# Patient Record
Sex: MALE | Race: BLACK OR AFRICAN AMERICAN | NOT HISPANIC OR LATINO | URBAN - METROPOLITAN AREA
[De-identification: names, ages, dates, MRNs, and addresses within clinical notes are randomized per-mention and may not be internally consistent; named-entity substitution may affect disease eponyms.]

---

## 2017-01-03 ENCOUNTER — AMBULATORY - HEALTHEAST (OUTPATIENT)
Dept: CARDIOLOGY | Facility: CLINIC | Age: 77
End: 2017-01-03

## 2017-01-03 ENCOUNTER — OFFICE VISIT - HEALTHEAST (OUTPATIENT)
Dept: INTERNAL MEDICINE | Facility: CLINIC | Age: 77
End: 2017-01-03

## 2017-01-03 ENCOUNTER — COMMUNICATION - HEALTHEAST (OUTPATIENT)
Dept: CARDIOLOGY | Facility: CLINIC | Age: 77
End: 2017-01-03

## 2017-01-03 ENCOUNTER — RECORDS - HEALTHEAST (OUTPATIENT)
Dept: GENERAL RADIOLOGY | Facility: CLINIC | Age: 77
End: 2017-01-03

## 2017-01-03 DIAGNOSIS — I48.19 PERSISTENT ATRIAL FIBRILLATION (H): ICD-10-CM

## 2017-01-03 DIAGNOSIS — R07.9 CHEST PAIN: ICD-10-CM

## 2017-01-03 DIAGNOSIS — R07.9 CHEST PAIN, UNSPECIFIED: ICD-10-CM

## 2017-01-03 ASSESSMENT — MIFFLIN-ST. JEOR: SCORE: 1416.14

## 2017-01-10 ENCOUNTER — AMBULATORY - HEALTHEAST (OUTPATIENT)
Dept: CARDIOLOGY | Facility: CLINIC | Age: 77
End: 2017-01-10

## 2017-01-10 DIAGNOSIS — I48.91 ATRIAL FIBRILLATION (H): ICD-10-CM

## 2017-01-10 ASSESSMENT — MIFFLIN-ST. JEOR
SCORE: 1431.25
SCORE: 1432.01

## 2017-01-11 ENCOUNTER — AMBULATORY - HEALTHEAST (OUTPATIENT)
Dept: CARDIOLOGY | Facility: CLINIC | Age: 77
End: 2017-01-11

## 2017-01-11 ASSESSMENT — MIFFLIN-ST. JEOR: SCORE: 1427.92

## 2017-01-12 ENCOUNTER — SURGERY - HEALTHEAST (OUTPATIENT)
Dept: CARDIOLOGY | Facility: CLINIC | Age: 77
End: 2017-01-12

## 2017-01-12 ASSESSMENT — MIFFLIN-ST. JEOR
SCORE: 1412.5
SCORE: 1368.51

## 2017-01-13 ASSESSMENT — MIFFLIN-ST. JEOR
SCORE: 1338.75
SCORE: 1368.75

## 2017-01-14 ENCOUNTER — COMMUNICATION - HEALTHEAST (OUTPATIENT)
Dept: INTERNAL MEDICINE | Facility: CLINIC | Age: 77
End: 2017-01-14

## 2017-01-14 ASSESSMENT — MIFFLIN-ST. JEOR: SCORE: 1342.2

## 2017-01-16 ENCOUNTER — OFFICE VISIT - HEALTHEAST (OUTPATIENT)
Dept: INTERNAL MEDICINE | Facility: CLINIC | Age: 77
End: 2017-01-16

## 2017-01-16 ENCOUNTER — AMBULATORY - HEALTHEAST (OUTPATIENT)
Dept: CARDIOLOGY | Facility: CLINIC | Age: 77
End: 2017-01-16

## 2017-01-16 ENCOUNTER — AMBULATORY - HEALTHEAST (OUTPATIENT)
Dept: INTERNAL MEDICINE | Facility: CLINIC | Age: 77
End: 2017-01-16

## 2017-01-16 DIAGNOSIS — I25.10 CAD (CORONARY ARTERY DISEASE): ICD-10-CM

## 2017-01-16 DIAGNOSIS — I48.91 ATRIAL FIBRILLATION (H): ICD-10-CM

## 2017-01-16 ASSESSMENT — MIFFLIN-ST. JEOR: SCORE: 1354.9

## 2017-01-19 ENCOUNTER — OFFICE VISIT - HEALTHEAST (OUTPATIENT)
Dept: CARDIOLOGY | Facility: CLINIC | Age: 77
End: 2017-01-19

## 2017-01-19 DIAGNOSIS — I48.3 TYPICAL ATRIAL FLUTTER (H): ICD-10-CM

## 2017-01-19 LAB
ATRIAL RATE - MUSE: 202 BPM
DIASTOLIC BLOOD PRESSURE - MUSE: NORMAL MMHG
INTERPRETATION ECG - MUSE: NORMAL
P AXIS - MUSE: NORMAL DEGREES
PR INTERVAL - MUSE: NORMAL MS
QRS DURATION - MUSE: 78 MS
QT - MUSE: 320 MS
QTC - MUSE: 364 MS
R AXIS - MUSE: 16 DEGREES
SYSTOLIC BLOOD PRESSURE - MUSE: NORMAL MMHG
T AXIS - MUSE: 181 DEGREES
VENTRICULAR RATE- MUSE: 78 BPM

## 2017-01-19 ASSESSMENT — MIFFLIN-ST. JEOR: SCORE: 1373.04

## 2017-01-23 ENCOUNTER — AMBULATORY - HEALTHEAST (OUTPATIENT)
Dept: CARDIOLOGY | Facility: CLINIC | Age: 77
End: 2017-01-23

## 2017-01-23 DIAGNOSIS — I48.91 ATRIAL FIBRILLATION (H): ICD-10-CM

## 2017-01-30 ENCOUNTER — AMBULATORY - HEALTHEAST (OUTPATIENT)
Dept: CARDIOLOGY | Facility: CLINIC | Age: 77
End: 2017-01-30

## 2017-01-30 DIAGNOSIS — I48.91 ATRIAL FIBRILLATION (H): ICD-10-CM

## 2017-02-01 ENCOUNTER — COMMUNICATION - HEALTHEAST (OUTPATIENT)
Dept: INTERNAL MEDICINE | Facility: CLINIC | Age: 77
End: 2017-02-01

## 2017-02-02 ENCOUNTER — COMMUNICATION - HEALTHEAST (OUTPATIENT)
Dept: INTERNAL MEDICINE | Facility: CLINIC | Age: 77
End: 2017-02-02

## 2017-02-02 ENCOUNTER — OFFICE VISIT - HEALTHEAST (OUTPATIENT)
Dept: INTERNAL MEDICINE | Facility: CLINIC | Age: 77
End: 2017-02-02

## 2017-02-02 DIAGNOSIS — I48.3 TYPICAL ATRIAL FLUTTER (H): ICD-10-CM

## 2017-02-02 ASSESSMENT — MIFFLIN-ST. JEOR: SCORE: 1359.43

## 2017-02-06 ENCOUNTER — AMBULATORY - HEALTHEAST (OUTPATIENT)
Dept: CARDIOLOGY | Facility: CLINIC | Age: 77
End: 2017-02-06

## 2017-02-06 DIAGNOSIS — I48.91 ATRIAL FIBRILLATION (H): ICD-10-CM

## 2017-02-09 ENCOUNTER — ANESTHESIA - HEALTHEAST (OUTPATIENT)
Dept: CARDIOLOGY | Facility: CLINIC | Age: 77
End: 2017-02-09

## 2017-02-16 ENCOUNTER — COMMUNICATION - HEALTHEAST (OUTPATIENT)
Dept: CARDIOLOGY | Facility: CLINIC | Age: 77
End: 2017-02-16

## 2017-02-16 DIAGNOSIS — E78.5 HYPERLIPIDEMIA: ICD-10-CM

## 2017-02-16 DIAGNOSIS — I48.20 CHRONIC ATRIAL FIBRILLATION (H): ICD-10-CM

## 2017-02-17 ENCOUNTER — COMMUNICATION - HEALTHEAST (OUTPATIENT)
Dept: CARDIOLOGY | Facility: CLINIC | Age: 77
End: 2017-02-17

## 2017-02-17 ENCOUNTER — COMMUNICATION - HEALTHEAST (OUTPATIENT)
Dept: INTERNAL MEDICINE | Facility: CLINIC | Age: 77
End: 2017-02-17

## 2017-02-17 DIAGNOSIS — E78.5 HYPERLIPIDEMIA: ICD-10-CM

## 2017-02-17 DIAGNOSIS — I48.20 CHRONIC ATRIAL FIBRILLATION (H): ICD-10-CM

## 2017-02-22 ENCOUNTER — COMMUNICATION - HEALTHEAST (OUTPATIENT)
Dept: INTERNAL MEDICINE | Facility: CLINIC | Age: 77
End: 2017-02-22

## 2017-02-22 ENCOUNTER — COMMUNICATION - HEALTHEAST (OUTPATIENT)
Dept: CARDIOLOGY | Facility: CLINIC | Age: 77
End: 2017-02-22

## 2017-02-22 DIAGNOSIS — I48.20 CHRONIC ATRIAL FIBRILLATION (H): ICD-10-CM

## 2017-02-23 ENCOUNTER — COMMUNICATION - HEALTHEAST (OUTPATIENT)
Dept: INTERNAL MEDICINE | Facility: CLINIC | Age: 77
End: 2017-02-23

## 2017-02-24 ENCOUNTER — AMBULATORY - HEALTHEAST (OUTPATIENT)
Dept: CARDIOLOGY | Facility: CLINIC | Age: 77
End: 2017-02-24

## 2017-02-24 DIAGNOSIS — I48.91 ATRIAL FIBRILLATION (H): ICD-10-CM

## 2017-03-08 ENCOUNTER — OFFICE VISIT - HEALTHEAST (OUTPATIENT)
Dept: CARDIOLOGY | Facility: CLINIC | Age: 77
End: 2017-03-08

## 2017-03-08 DIAGNOSIS — I25.10 CORONARY ARTERY DISEASE DUE TO CALCIFIED CORONARY LESION: ICD-10-CM

## 2017-03-08 DIAGNOSIS — I25.84 CORONARY ARTERY DISEASE DUE TO CALCIFIED CORONARY LESION: ICD-10-CM

## 2017-03-08 DIAGNOSIS — I10 ESSENTIAL HYPERTENSION WITH GOAL BLOOD PRESSURE LESS THAN 140/90: ICD-10-CM

## 2017-03-08 DIAGNOSIS — I48.3 TYPICAL ATRIAL FLUTTER (H): ICD-10-CM

## 2017-03-08 LAB
ATRIAL RATE - MUSE: 62 BPM
DIASTOLIC BLOOD PRESSURE - MUSE: NORMAL MMHG
INTERPRETATION ECG - MUSE: NORMAL
P AXIS - MUSE: 95 DEGREES
PR INTERVAL - MUSE: 170 MS
QRS DURATION - MUSE: 80 MS
QT - MUSE: 446 MS
QTC - MUSE: 452 MS
R AXIS - MUSE: 15 DEGREES
SYSTOLIC BLOOD PRESSURE - MUSE: NORMAL MMHG
T AXIS - MUSE: 61 DEGREES
VENTRICULAR RATE- MUSE: 62 BPM

## 2017-03-08 ASSESSMENT — MIFFLIN-ST. JEOR: SCORE: 1370.78

## 2017-03-10 ENCOUNTER — OFFICE VISIT - HEALTHEAST (OUTPATIENT)
Dept: INTERNAL MEDICINE | Facility: CLINIC | Age: 77
End: 2017-03-10

## 2017-03-10 DIAGNOSIS — E11.9 DIABETES MELLITUS, TYPE 2 (H): ICD-10-CM

## 2017-03-10 LAB
CHOLEST SERPL-MCNC: 183 MG/DL
FASTING STATUS PATIENT QL REPORTED: NORMAL
HBA1C MFR BLD: 5.1 % (ref 3.5–6)
HDLC SERPL-MCNC: 86 MG/DL
LDLC SERPL CALC-MCNC: 87 MG/DL
TRIGL SERPL-MCNC: 48 MG/DL

## 2017-03-10 ASSESSMENT — MIFFLIN-ST. JEOR: SCORE: 1348.1

## 2017-03-13 ENCOUNTER — COMMUNICATION - HEALTHEAST (OUTPATIENT)
Dept: INTERNAL MEDICINE | Facility: CLINIC | Age: 77
End: 2017-03-13

## 2017-03-24 ENCOUNTER — AMBULATORY - HEALTHEAST (OUTPATIENT)
Dept: CARDIOLOGY | Facility: CLINIC | Age: 77
End: 2017-03-24

## 2017-03-24 DIAGNOSIS — I48.91 ATRIAL FIBRILLATION (H): ICD-10-CM

## 2017-04-07 ENCOUNTER — AMBULATORY - HEALTHEAST (OUTPATIENT)
Dept: CARDIOLOGY | Facility: CLINIC | Age: 77
End: 2017-04-07

## 2017-04-07 DIAGNOSIS — I48.91 ATRIAL FIBRILLATION (H): ICD-10-CM

## 2017-04-11 ENCOUNTER — AMBULATORY - HEALTHEAST (OUTPATIENT)
Dept: INTERNAL MEDICINE | Facility: CLINIC | Age: 77
End: 2017-04-11

## 2017-04-11 ENCOUNTER — OFFICE VISIT - HEALTHEAST (OUTPATIENT)
Dept: INTERNAL MEDICINE | Facility: CLINIC | Age: 77
End: 2017-04-11

## 2017-04-11 DIAGNOSIS — I10 ESSENTIAL HYPERTENSION WITH GOAL BLOOD PRESSURE LESS THAN 140/90: ICD-10-CM

## 2017-04-11 DIAGNOSIS — K92.1 MELENA: ICD-10-CM

## 2017-04-11 ASSESSMENT — MIFFLIN-ST. JEOR: SCORE: 1375.31

## 2017-04-13 ENCOUNTER — COMMUNICATION - HEALTHEAST (OUTPATIENT)
Dept: INTERNAL MEDICINE | Facility: CLINIC | Age: 77
End: 2017-04-13

## 2017-05-02 ENCOUNTER — AMBULATORY - HEALTHEAST (OUTPATIENT)
Dept: LAB | Facility: CLINIC | Age: 77
End: 2017-05-02

## 2017-05-02 ENCOUNTER — COMMUNICATION - HEALTHEAST (OUTPATIENT)
Dept: INTERNAL MEDICINE | Facility: CLINIC | Age: 77
End: 2017-05-02

## 2017-05-02 DIAGNOSIS — D64.9 ANEMIA: ICD-10-CM

## 2017-05-05 ENCOUNTER — AMBULATORY - HEALTHEAST (OUTPATIENT)
Dept: CARDIOLOGY | Facility: CLINIC | Age: 77
End: 2017-05-05

## 2017-05-05 DIAGNOSIS — I48.91 ATRIAL FIBRILLATION (H): ICD-10-CM

## 2017-05-15 ENCOUNTER — AMBULATORY - HEALTHEAST (OUTPATIENT)
Dept: CARDIOLOGY | Facility: CLINIC | Age: 77
End: 2017-05-15

## 2017-05-15 DIAGNOSIS — I48.91 ATRIAL FIBRILLATION (H): ICD-10-CM

## 2017-05-23 ENCOUNTER — OFFICE VISIT - HEALTHEAST (OUTPATIENT)
Dept: INTERNAL MEDICINE | Facility: CLINIC | Age: 77
End: 2017-05-23

## 2017-05-23 DIAGNOSIS — E11.9 DIABETES MELLITUS, TYPE 2 (H): ICD-10-CM

## 2017-05-23 ASSESSMENT — MIFFLIN-ST. JEOR: SCORE: 1366.24

## 2017-05-31 ENCOUNTER — OFFICE VISIT - HEALTHEAST (OUTPATIENT)
Dept: CARDIOLOGY | Facility: CLINIC | Age: 77
End: 2017-05-31

## 2017-05-31 DIAGNOSIS — I48.4 ATYPICAL ATRIAL FLUTTER (H): ICD-10-CM

## 2017-05-31 DIAGNOSIS — I25.10 CORONARY ARTERY DISEASE INVOLVING NATIVE CORONARY ARTERY OF NATIVE HEART WITHOUT ANGINA PECTORIS: ICD-10-CM

## 2017-05-31 DIAGNOSIS — I10 ESSENTIAL HYPERTENSION: ICD-10-CM

## 2017-05-31 LAB
ATRIAL RATE - MUSE: 56 BPM
DIASTOLIC BLOOD PRESSURE - MUSE: NORMAL MMHG
INTERPRETATION ECG - MUSE: NORMAL
P AXIS - MUSE: 20 DEGREES
PR INTERVAL - MUSE: 186 MS
QRS DURATION - MUSE: 80 MS
QT - MUSE: 446 MS
QTC - MUSE: 430 MS
R AXIS - MUSE: -3 DEGREES
SYSTOLIC BLOOD PRESSURE - MUSE: NORMAL MMHG
T AXIS - MUSE: 105 DEGREES
VENTRICULAR RATE- MUSE: 56 BPM

## 2017-05-31 ASSESSMENT — MIFFLIN-ST. JEOR: SCORE: 1378.03

## 2017-06-12 ENCOUNTER — COMMUNICATION - HEALTHEAST (OUTPATIENT)
Dept: CARDIOLOGY | Facility: CLINIC | Age: 77
End: 2017-06-12

## 2017-06-12 DIAGNOSIS — I48.4 ATYPICAL ATRIAL FLUTTER (H): ICD-10-CM

## 2017-06-16 ENCOUNTER — AMBULATORY - HEALTHEAST (OUTPATIENT)
Dept: CARDIOLOGY | Facility: CLINIC | Age: 77
End: 2017-06-16

## 2017-06-16 DIAGNOSIS — I48.91 ATRIAL FIBRILLATION (H): ICD-10-CM

## 2017-06-30 ENCOUNTER — AMBULATORY - HEALTHEAST (OUTPATIENT)
Dept: CARDIOLOGY | Facility: CLINIC | Age: 77
End: 2017-06-30

## 2017-06-30 DIAGNOSIS — I48.91 ATRIAL FIBRILLATION (H): ICD-10-CM

## 2017-07-10 ENCOUNTER — OFFICE VISIT - HEALTHEAST (OUTPATIENT)
Dept: CARDIOLOGY | Facility: CLINIC | Age: 77
End: 2017-07-10

## 2017-07-10 DIAGNOSIS — I25.84 CORONARY ARTERY DISEASE DUE TO CALCIFIED CORONARY LESION: ICD-10-CM

## 2017-07-10 DIAGNOSIS — I48.3 TYPICAL ATRIAL FLUTTER (H): ICD-10-CM

## 2017-07-10 DIAGNOSIS — I10 ESSENTIAL HYPERTENSION WITH GOAL BLOOD PRESSURE LESS THAN 140/90: ICD-10-CM

## 2017-07-10 DIAGNOSIS — I25.10 CORONARY ARTERY DISEASE DUE TO CALCIFIED CORONARY LESION: ICD-10-CM

## 2017-07-10 ASSESSMENT — MIFFLIN-ST. JEOR: SCORE: 1370.78

## 2017-07-17 ENCOUNTER — AMBULATORY - HEALTHEAST (OUTPATIENT)
Dept: CARDIOLOGY | Facility: CLINIC | Age: 77
End: 2017-07-17

## 2017-07-17 DIAGNOSIS — I48.91 ATRIAL FIBRILLATION (H): ICD-10-CM

## 2017-07-17 DIAGNOSIS — I48.3 TYPICAL ATRIAL FLUTTER (H): ICD-10-CM

## 2017-07-25 ENCOUNTER — AMBULATORY - HEALTHEAST (OUTPATIENT)
Dept: CARDIOLOGY | Facility: CLINIC | Age: 77
End: 2017-07-25

## 2017-07-25 ENCOUNTER — OFFICE VISIT - HEALTHEAST (OUTPATIENT)
Dept: INTERNAL MEDICINE | Facility: CLINIC | Age: 77
End: 2017-07-25

## 2017-07-25 DIAGNOSIS — I48.3 TYPICAL ATRIAL FLUTTER (H): ICD-10-CM

## 2017-07-25 DIAGNOSIS — I48.91 ATRIAL FIBRILLATION (H): ICD-10-CM

## 2017-07-25 DIAGNOSIS — Z01.810 PREOP CARDIOVASCULAR EXAM: ICD-10-CM

## 2017-07-25 ASSESSMENT — MIFFLIN-ST. JEOR: SCORE: 1348.1

## 2017-08-14 ENCOUNTER — AMBULATORY - HEALTHEAST (OUTPATIENT)
Dept: CARDIOLOGY | Facility: CLINIC | Age: 77
End: 2017-08-14

## 2017-08-14 DIAGNOSIS — I48.3 TYPICAL ATRIAL FLUTTER (H): ICD-10-CM

## 2017-08-17 ENCOUNTER — COMMUNICATION - HEALTHEAST (OUTPATIENT)
Dept: CARDIOLOGY | Facility: CLINIC | Age: 77
End: 2017-08-17

## 2017-08-18 ENCOUNTER — RECORDS - HEALTHEAST (OUTPATIENT)
Dept: ADMINISTRATIVE | Facility: OTHER | Age: 77
End: 2017-08-18

## 2017-08-22 ENCOUNTER — HOSPITAL ENCOUNTER (OUTPATIENT)
Dept: INTERVENTIONAL RADIOLOGY/VASCULAR | Facility: HOSPITAL | Age: 77
Discharge: HOME | End: 2017-08-22
Attending: INTERNAL MEDICINE

## 2017-08-22 DIAGNOSIS — N19 RENAL FAILURE: ICD-10-CM

## 2017-08-28 ENCOUNTER — AMBULATORY - HEALTHEAST (OUTPATIENT)
Dept: CARDIOLOGY | Facility: CLINIC | Age: 77
End: 2017-08-28

## 2017-08-28 DIAGNOSIS — I48.3 TYPICAL ATRIAL FLUTTER (H): ICD-10-CM

## 2017-09-07 ENCOUNTER — OFFICE VISIT - HEALTHEAST (OUTPATIENT)
Dept: INTERNAL MEDICINE | Facility: CLINIC | Age: 77
End: 2017-09-07

## 2017-09-07 DIAGNOSIS — E11.9 DIABETES MELLITUS, TYPE 2 (H): ICD-10-CM

## 2017-09-07 LAB
CHOLEST SERPL-MCNC: 170 MG/DL
FASTING STATUS PATIENT QL REPORTED: YES
HBA1C MFR BLD: 4.7 % (ref 3.5–6)
HDLC SERPL-MCNC: 92 MG/DL
LDLC SERPL CALC-MCNC: 69 MG/DL
TRIGL SERPL-MCNC: 46 MG/DL

## 2017-09-07 ASSESSMENT — MIFFLIN-ST. JEOR: SCORE: 1343.56

## 2017-09-08 ENCOUNTER — COMMUNICATION - HEALTHEAST (OUTPATIENT)
Dept: INTERNAL MEDICINE | Facility: CLINIC | Age: 77
End: 2017-09-08

## 2017-09-11 ENCOUNTER — AMBULATORY - HEALTHEAST (OUTPATIENT)
Dept: CARDIOLOGY | Facility: CLINIC | Age: 77
End: 2017-09-11

## 2017-09-11 DIAGNOSIS — I48.3 TYPICAL ATRIAL FLUTTER (H): ICD-10-CM

## 2017-09-20 ENCOUNTER — AMBULATORY - HEALTHEAST (OUTPATIENT)
Dept: CARDIOLOGY | Facility: CLINIC | Age: 77
End: 2017-09-20

## 2017-09-20 DIAGNOSIS — I77.9 BILATERAL CAROTID ARTERY DISEASE (H): ICD-10-CM

## 2017-09-20 DIAGNOSIS — R09.89 CAROTID ARTERY BRUIT: ICD-10-CM

## 2017-09-26 ENCOUNTER — HOSPITAL ENCOUNTER (OUTPATIENT)
Dept: ULTRASOUND IMAGING | Facility: CLINIC | Age: 77
Discharge: HOME | End: 2017-09-26
Attending: INTERNAL MEDICINE

## 2017-09-26 DIAGNOSIS — R09.89 CAROTID ARTERY BRUIT: ICD-10-CM

## 2017-09-26 DIAGNOSIS — I77.9 BILATERAL CAROTID ARTERY DISEASE (H): ICD-10-CM

## 2017-10-08 ENCOUNTER — COMMUNICATION - HEALTHEAST (OUTPATIENT)
Dept: CARDIOLOGY | Facility: CLINIC | Age: 77
End: 2017-10-08

## 2017-10-08 DIAGNOSIS — E78.5 HYPERLIPIDEMIA: ICD-10-CM

## 2017-10-09 ENCOUNTER — AMBULATORY - HEALTHEAST (OUTPATIENT)
Dept: CARDIOLOGY | Facility: CLINIC | Age: 77
End: 2017-10-09

## 2017-10-09 ENCOUNTER — COMMUNICATION - HEALTHEAST (OUTPATIENT)
Dept: CARDIOLOGY | Facility: CLINIC | Age: 77
End: 2017-10-09

## 2017-10-09 DIAGNOSIS — I48.3 TYPICAL ATRIAL FLUTTER (H): ICD-10-CM

## 2017-10-09 DIAGNOSIS — I48.20 CHRONIC ATRIAL FIBRILLATION (H): ICD-10-CM

## 2017-10-11 ENCOUNTER — AMBULATORY - HEALTHEAST (OUTPATIENT)
Dept: CARDIOLOGY | Facility: CLINIC | Age: 77
End: 2017-10-11

## 2017-10-12 ENCOUNTER — OFFICE VISIT - HEALTHEAST (OUTPATIENT)
Dept: CARDIOLOGY | Facility: CLINIC | Age: 77
End: 2017-10-12

## 2017-10-12 DIAGNOSIS — I65.23 BILATERAL CAROTID ARTERY STENOSIS: ICD-10-CM

## 2017-10-12 DIAGNOSIS — I35.0 NONRHEUMATIC AORTIC VALVE STENOSIS: ICD-10-CM

## 2017-10-12 ASSESSMENT — MIFFLIN-ST. JEOR: SCORE: 1371.23

## 2017-10-19 ENCOUNTER — HOSPITAL ENCOUNTER (OUTPATIENT)
Dept: CARDIOLOGY | Facility: CLINIC | Age: 77
Discharge: HOME | End: 2017-10-19
Attending: INTERNAL MEDICINE

## 2017-10-19 LAB
AORTIC ROOT: 2.9 CM
AORTIC VALVE MEAN VELOCITY: 114 CM/S
ASCENDING AORTA: 3.3 CM
AV DIMENSIONLESS INDEX VTI: 0.7
AV MEAN GRADIENT: 6 MMHG
AV PEAK GRADIENT: 13.1 MMHG
AV VALVE AREA: 1.9 CM2
AV VELOCITY RATIO: 0.6
BSA FOR ECHO PROCEDURE: 1.82 M2
CV BLOOD PRESSURE: NORMAL MMHG
CV ECHO HEIGHT: 69 IN
CV ECHO WEIGHT: 150 LBS
DOP CALC AO PEAK VEL: 181 CM/S
DOP CALC AO VTI: 36.2 CM
DOP CALC LVOT AREA: 2.83 CM2
DOP CALC LVOT DIAMETER: 1.9 CM
DOP CALC LVOT PEAK VEL: 107 CM/S
DOP CALC LVOT STROKE VOLUME: 69.7 CM3
DOP CALCLVOT PEAK VEL VTI: 24.6 CM
EJECTION FRACTION: 63 % (ref 55–75)
FRACTIONAL SHORTENING: 36.8 % (ref 28–44)
INTERVENTRICULAR SEPTUM IN END DIASTOLE: 1.7 CM (ref 0.6–1)
IVS/PW RATIO: 1.1
LA AREA 1: 28.2 CM2
LA AREA 2: 22.7 CM2
LEFT ATRIUM AREA: 22.7 CM2
LEFT ATRIUM LENGTH: 5.5 CM
LEFT ATRIUM SIZE: 3.7 CM
LEFT ATRIUM TO AORTIC ROOT RATIO: 1.28 NO UNITS
LEFT ATRIUM VOLUME INDEX: 54.4 ML/M2
LEFT ATRIUM VOLUME: 98.9 CM3
LEFT VENTRICLE DIASTOLIC VOLUME INDEX: 25.3 CM3/M2 (ref 34–74)
LEFT VENTRICLE DIASTOLIC VOLUME: 46 CM3 (ref 62–150)
LEFT VENTRICLE MASS INDEX: 132 G/M2
LEFT VENTRICLE SYSTOLIC VOLUME INDEX: 9.3 CM3/M2 (ref 11–31)
LEFT VENTRICLE SYSTOLIC VOLUME: 17 CM3 (ref 21–61)
LEFT VENTRICULAR INTERNAL DIMENSION IN DIASTOLE: 3.8 CM (ref 4.2–5.8)
LEFT VENTRICULAR INTERNAL DIMENSION IN SYSTOLE: 2.4 CM (ref 2.5–4)
LEFT VENTRICULAR MASS: 240.3 G
LEFT VENTRICULAR OUTFLOW TRACT MEAN GRADIENT: 2 MMHG
LEFT VENTRICULAR OUTFLOW TRACT MEAN VELOCITY: 71.3 CM/S
LEFT VENTRICULAR OUTFLOW TRACT PEAK GRADIENT: 5 MMHG
LEFT VENTRICULAR POSTERIOR WALL IN END DIASTOLE: 1.5 CM (ref 0.6–1)
LV STROKE VOLUME INDEX: 38.3 ML/M2
MITRAL VALVE E/A RATIO: 0.7
MV AVERAGE E/E' RATIO: 14.3 CM/S
MV DECELERATION TIME: 268 MS
MV E'TISSUE VEL-LAT: 5.36 CM/S
MV E'TISSUE VEL-MED: 3.22 CM/S
MV LATERAL E/E' RATIO: 11.4
MV MEDIAL E/E' RATIO: 19
MV PEAK A VELOCITY: 82.9 CM/S
MV PEAK E VELOCITY: 61.2 CM/S
NUC REST DIASTOLIC VOLUME INDEX: 2400 LBS
NUC REST SYSTOLIC VOLUME INDEX: 69 IN
TRICUSPID REGURGITATION PEAK PRESSURE GRADIENT: 31.4 MMHG
TRICUSPID VALVE ANULAR PLANE SYSTOLIC EXCURSION: 2.3 CM
TRICUSPID VALVE PEAK REGURGITANT VELOCITY: 280 CM/S

## 2017-10-19 ASSESSMENT — MIFFLIN-ST. JEOR: SCORE: 1370.78

## 2017-11-06 ENCOUNTER — AMBULATORY - HEALTHEAST (OUTPATIENT)
Dept: CARDIOLOGY | Facility: CLINIC | Age: 77
End: 2017-11-06

## 2017-11-06 DIAGNOSIS — I48.3 TYPICAL ATRIAL FLUTTER (H): ICD-10-CM

## 2017-11-07 ENCOUNTER — OFFICE VISIT - HEALTHEAST (OUTPATIENT)
Dept: INTERNAL MEDICINE | Facility: CLINIC | Age: 77
End: 2017-11-07

## 2017-11-07 DIAGNOSIS — E11.9 DIABETES MELLITUS, TYPE 2 (H): ICD-10-CM

## 2017-11-07 LAB
CHOLEST SERPL-MCNC: 201 MG/DL
FASTING STATUS PATIENT QL REPORTED: YES
HBA1C MFR BLD: 5.2 % (ref 3.5–6)
HDLC SERPL-MCNC: 99 MG/DL
LDLC SERPL CALC-MCNC: 85 MG/DL
TRIGL SERPL-MCNC: 86 MG/DL

## 2017-11-07 ASSESSMENT — MIFFLIN-ST. JEOR: SCORE: 1334.49

## 2017-11-09 ENCOUNTER — COMMUNICATION - HEALTHEAST (OUTPATIENT)
Dept: INTERNAL MEDICINE | Facility: CLINIC | Age: 77
End: 2017-11-09

## 2017-12-04 ENCOUNTER — AMBULATORY - HEALTHEAST (OUTPATIENT)
Dept: CARDIOLOGY | Facility: CLINIC | Age: 77
End: 2017-12-04

## 2017-12-04 DIAGNOSIS — I48.3 TYPICAL ATRIAL FLUTTER (H): ICD-10-CM

## 2017-12-11 ENCOUNTER — COMMUNICATION - HEALTHEAST (OUTPATIENT)
Dept: INTERNAL MEDICINE | Facility: CLINIC | Age: 77
End: 2017-12-11

## 2017-12-22 ENCOUNTER — AMBULATORY - HEALTHEAST (OUTPATIENT)
Dept: CARDIOLOGY | Facility: CLINIC | Age: 77
End: 2017-12-22

## 2017-12-22 DIAGNOSIS — I48.3 TYPICAL ATRIAL FLUTTER (H): ICD-10-CM

## 2018-01-01 ENCOUNTER — AMBULATORY - HEALTHEAST (OUTPATIENT)
Dept: CARDIOLOGY | Facility: CLINIC | Age: 78
End: 2018-01-01

## 2018-01-01 ENCOUNTER — COMMUNICATION - HEALTHEAST (OUTPATIENT)
Dept: INTERNAL MEDICINE | Facility: CLINIC | Age: 78
End: 2018-01-01

## 2018-01-01 ENCOUNTER — COMMUNICATION - HEALTHEAST (OUTPATIENT)
Dept: CARDIOLOGY | Facility: CLINIC | Age: 78
End: 2018-01-01

## 2018-01-01 ENCOUNTER — HOME CARE/HOSPICE - HEALTHEAST (OUTPATIENT)
Dept: HOME HEALTH SERVICES | Facility: HOME HEALTH | Age: 78
End: 2018-01-01

## 2018-01-01 ENCOUNTER — RECORDS - HEALTHEAST (OUTPATIENT)
Dept: LAB | Facility: CLINIC | Age: 78
End: 2018-01-01

## 2018-01-01 ENCOUNTER — OFFICE VISIT - HEALTHEAST (OUTPATIENT)
Dept: GERIATRICS | Facility: CLINIC | Age: 78
End: 2018-01-01

## 2018-01-01 ENCOUNTER — COMMUNICATION - HEALTHEAST (OUTPATIENT)
Dept: PHYSICAL THERAPY | Age: 78
End: 2018-01-01

## 2018-01-01 ENCOUNTER — OFFICE VISIT - HEALTHEAST (OUTPATIENT)
Dept: OTOLARYNGOLOGY | Facility: CLINIC | Age: 78
End: 2018-01-01

## 2018-01-01 ENCOUNTER — OFFICE VISIT - HEALTHEAST (OUTPATIENT)
Dept: INTERNAL MEDICINE | Facility: CLINIC | Age: 78
End: 2018-01-01

## 2018-01-01 ENCOUNTER — RECORDS - HEALTHEAST (OUTPATIENT)
Dept: ADMINISTRATIVE | Facility: OTHER | Age: 78
End: 2018-01-01

## 2018-01-01 ENCOUNTER — COMMUNICATION - HEALTHEAST (OUTPATIENT)
Dept: GERIATRICS | Facility: CLINIC | Age: 78
End: 2018-01-01

## 2018-01-01 ENCOUNTER — COMMUNICATION - HEALTHEAST (OUTPATIENT)
Dept: HOME HEALTH SERVICES | Facility: HOME HEALTH | Age: 78
End: 2018-01-01

## 2018-01-01 ENCOUNTER — AMBULATORY - HEALTHEAST (OUTPATIENT)
Dept: INTERNAL MEDICINE | Facility: CLINIC | Age: 78
End: 2018-01-01

## 2018-01-01 ENCOUNTER — HOSPITAL ENCOUNTER (OUTPATIENT)
Dept: INTERVENTIONAL RADIOLOGY/VASCULAR | Facility: HOSPITAL | Age: 78
Discharge: HOME | End: 2018-07-24
Attending: INTERNAL MEDICINE | Admitting: RADIOLOGY

## 2018-01-01 ENCOUNTER — AMBULATORY - HEALTHEAST (OUTPATIENT)
Dept: ADMINISTRATIVE | Facility: CLINIC | Age: 78
End: 2018-01-01

## 2018-01-01 ENCOUNTER — COMMUNICATION - HEALTHEAST (OUTPATIENT)
Dept: SCHEDULING | Facility: CLINIC | Age: 78
End: 2018-01-01

## 2018-01-01 ENCOUNTER — OFFICE VISIT - HEALTHEAST (OUTPATIENT)
Dept: CARDIOLOGY | Facility: CLINIC | Age: 78
End: 2018-01-01

## 2018-01-01 ENCOUNTER — AMBULATORY - HEALTHEAST (OUTPATIENT)
Dept: GERIATRICS | Facility: CLINIC | Age: 78
End: 2018-01-01

## 2018-01-01 ENCOUNTER — OFFICE VISIT - HEALTHEAST (OUTPATIENT)
Dept: VASCULAR SURGERY | Facility: CLINIC | Age: 78
End: 2018-01-01

## 2018-01-01 DIAGNOSIS — I25.10 CORONARY ARTERY DISEASE INVOLVING NATIVE CORONARY ARTERY OF NATIVE HEART WITHOUT ANGINA PECTORIS: ICD-10-CM

## 2018-01-01 DIAGNOSIS — R56.9 SEIZURE (H): ICD-10-CM

## 2018-01-01 DIAGNOSIS — I48.91 A-FIB (H): ICD-10-CM

## 2018-01-01 DIAGNOSIS — I10 ESSENTIAL HYPERTENSION: ICD-10-CM

## 2018-01-01 DIAGNOSIS — Z79.01 ANTICOAGULATION MANAGEMENT ENCOUNTER: ICD-10-CM

## 2018-01-01 DIAGNOSIS — I48.3 TYPICAL ATRIAL FLUTTER (H): ICD-10-CM

## 2018-01-01 DIAGNOSIS — I48.91 ATRIAL FIBRILLATION (H): ICD-10-CM

## 2018-01-01 DIAGNOSIS — F43.21 SITUATIONAL DEPRESSION: ICD-10-CM

## 2018-01-01 DIAGNOSIS — Z99.2 ESRD (END STAGE RENAL DISEASE) ON DIALYSIS (H): ICD-10-CM

## 2018-01-01 DIAGNOSIS — I73.9 PERIPHERAL ARTERIAL DISEASE (H): ICD-10-CM

## 2018-01-01 DIAGNOSIS — G62.9 NEUROPATHY: ICD-10-CM

## 2018-01-01 DIAGNOSIS — E11.9 DIABETES MELLITUS, TYPE 2 (H): ICD-10-CM

## 2018-01-01 DIAGNOSIS — I48.0 PAROXYSMAL ATRIAL FIBRILLATION (H): ICD-10-CM

## 2018-01-01 DIAGNOSIS — L89.323 PRESSURE ULCER OF LEFT BUTTOCK, STAGE 3 (H): ICD-10-CM

## 2018-01-01 DIAGNOSIS — Z51.81 ANTICOAGULATION MANAGEMENT ENCOUNTER: ICD-10-CM

## 2018-01-01 DIAGNOSIS — E11.29 TYPE 2 DIABETES MELLITUS WITH KIDNEY COMPLICATION (H): ICD-10-CM

## 2018-01-01 DIAGNOSIS — I48.20 CHRONIC ATRIAL FIBRILLATION (H): ICD-10-CM
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
## 2018-01-01 DIAGNOSIS — N18.6 ESRD (END STAGE RENAL DISEASE) ON DIALYSIS (H): ICD-10-CM

## 2018-01-01 DIAGNOSIS — R53.81 PHYSICAL DECONDITIONING: ICD-10-CM

## 2018-01-01 DIAGNOSIS — I73.9 PERIPHERAL ARTERY DISEASE (H): ICD-10-CM

## 2018-01-01 DIAGNOSIS — I10 HTN (HYPERTENSION): ICD-10-CM

## 2018-01-01 DIAGNOSIS — L89.309 DECUBITUS ULCER OF BUTTOCK: ICD-10-CM

## 2018-01-01 DIAGNOSIS — I48.4 ATYPICAL ATRIAL FLUTTER (H): ICD-10-CM

## 2018-01-01 DIAGNOSIS — H61.23 BILATERAL IMPACTED CERUMEN: ICD-10-CM

## 2018-01-01 DIAGNOSIS — N19 RENAL FAILURE: ICD-10-CM

## 2018-01-01 DIAGNOSIS — R79.1 PROLONGED BLEEDING TIME: ICD-10-CM

## 2018-01-01 DIAGNOSIS — I50.9 CONGESTIVE HEART FAILURE, UNSPECIFIED HF CHRONICITY, UNSPECIFIED HEART FAILURE TYPE (H): ICD-10-CM
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
## 2018-01-01 DIAGNOSIS — R68.89 ACTIVITY INTOLERANCE: ICD-10-CM

## 2018-01-01 DIAGNOSIS — L89.313 PRESSURE ULCER OF RIGHT BUTTOCK, STAGE 3 (H): ICD-10-CM

## 2018-01-01 DIAGNOSIS — F32.A DEPRESSION: ICD-10-CM

## 2018-01-01 DIAGNOSIS — E78.5 HYPERLIPIDEMIA: ICD-10-CM

## 2018-01-01 LAB
ALBUMIN SERPL-MCNC: 3.9 G/DL (ref 3.5–5)
ALP SERPL-CCNC: 138 U/L (ref 45–120)
ALT SERPL W P-5'-P-CCNC: 20 U/L (ref 0–45)
ANION GAP SERPL CALCULATED.3IONS-SCNC: 19 MMOL/L (ref 5–18)
AST SERPL W P-5'-P-CCNC: 10 U/L (ref 0–40)
BILIRUB SERPL-MCNC: 0.4 MG/DL (ref 0–1)
BUN SERPL-MCNC: 44 MG/DL (ref 8–28)
CALCIUM SERPL-MCNC: 10.7 MG/DL (ref 8.5–10.5)
CHLORIDE BLD-SCNC: 102 MMOL/L (ref 98–107)
CHOLEST SERPL-MCNC: 177 MG/DL
CHOLEST SERPL-MCNC: 180 MG/DL
CHOLEST SERPL-MCNC: 181 MG/DL
CHOLEST SERPL-MCNC: 191 MG/DL
CO2 SERPL-SCNC: 24 MMOL/L (ref 22–31)
CREAT SERPL-MCNC: 9.43 MG/DL (ref 0.7–1.3)
ERYTHROCYTE [DISTWIDTH] IN BLOOD BY AUTOMATED COUNT: 12.2 % (ref 11–14.5)
FASTING STATUS PATIENT QL REPORTED: ABNORMAL
FASTING STATUS PATIENT QL REPORTED: NORMAL
FASTING STATUS PATIENT QL REPORTED: YES
GFR SERPL CREATININE-BSD FRML MDRD: 5 ML/MIN/1.73M2
GLUCOSE BLD-MCNC: 104 MG/DL (ref 70–125)
GLUCOSE BLD-MCNC: 163 MG/DL (ref 70–125)
GLUCOSE BLD-MCNC: 79 MG/DL (ref 70–125)
GLUCOSE BLD-MCNC: 86 MG/DL (ref 70–125)
HBA1C MFR BLD: 4.9 % (ref 3.5–6)
HBA1C MFR BLD: 5.4 % (ref 3.5–6)
HBA1C MFR BLD: 5.7 % (ref 3.5–6)
HBA1C MFR BLD: 6 % (ref 3.5–6)
HCT VFR BLD AUTO: 35.4 % (ref 40–54)
HDLC SERPL-MCNC: 66 MG/DL
HDLC SERPL-MCNC: 80 MG/DL
HDLC SERPL-MCNC: 84 MG/DL
HDLC SERPL-MCNC: 92 MG/DL
HGB BLD-MCNC: 11.5 G/DL (ref 14–18)
HGB BLD-MCNC: 7.7 G/DL (ref 14–18)
HGB BLD-MCNC: 8.1 G/DL (ref 14–18)
INR PPP: 2.3 (ref 0.9–1.1)
INTERNATIONAL NORMALIZATION RATIO, POC - HISTORICAL: 1.7
INTERNATIONAL NORMALIZATION RATIO, POC - HISTORICAL: 2
INTERNATIONAL NORMALIZATION RATIO, POC - HISTORICAL: 2.1
INTERNATIONAL NORMALIZATION RATIO, POC - HISTORICAL: 2.3
INTERNATIONAL NORMALIZATION RATIO, POC - HISTORICAL: 2.4
INTERNATIONAL NORMALIZATION RATIO, POC - HISTORICAL: 2.4
INTERNATIONAL NORMALIZATION RATIO, POC - HISTORICAL: 3
INTERNATIONAL NORMALIZATION RATIO, POC - HISTORICAL: 3.1
INTERNATIONAL NORMALIZATION RATIO, POC - HISTORICAL: 3.3
INTERNATIONAL NORMALIZATION RATIO, POC - HISTORICAL: 4.3
INTERNATIONAL NORMALIZATION RATIO, POC - HISTORICAL: 6.2
LDLC SERPL CALC-MCNC: 71 MG/DL
LDLC SERPL CALC-MCNC: 83 MG/DL
LDLC SERPL CALC-MCNC: 86 MG/DL
LDLC SERPL CALC-MCNC: 97 MG/DL
MCH RBC QN AUTO: 32 PG (ref 27–34)
MCHC RBC AUTO-ENTMCNC: 32.6 G/DL (ref 32–36)
MCV RBC AUTO: 98 FL (ref 80–100)
PLATELET # BLD AUTO: 252 THOU/UL (ref 140–440)
PLATELET # BLD AUTO: 299 THOU/UL (ref 140–440)
PMV BLD AUTO: 8.2 FL (ref 7–10)
POC INR - HE - HISTORICAL: 2.6 (ref 0.9–1.1)
POTASSIUM BLD-SCNC: 3.9 MMOL/L (ref 3.5–5)
POTASSIUM BLD-SCNC: 4.8 MMOL/L (ref 3.5–5)
PROT SERPL-MCNC: 8.1 G/DL (ref 6–8)
RBC # BLD AUTO: 3.6 MILL/UL (ref 4.4–6.2)
SODIUM SERPL-SCNC: 145 MMOL/L (ref 136–145)
TRIGL SERPL-MCNC: 125 MG/DL
TRIGL SERPL-MCNC: 64 MG/DL
TRIGL SERPL-MCNC: 68 MG/DL
TRIGL SERPL-MCNC: 92 MG/DL
WBC: 6.8 THOU/UL (ref 4–11)

## 2018-01-01 ASSESSMENT — MIFFLIN-ST. JEOR
SCORE: 1384.84
SCORE: 1410.69
SCORE: 1366.24
SCORE: 1379.85
SCORE: 1334.49
SCORE: 1388.92
SCORE: 1370.78
SCORE: 1407.06
SCORE: 1375.31
SCORE: 1388.92

## 2018-01-03 ENCOUNTER — AMBULATORY - HEALTHEAST (OUTPATIENT)
Dept: CARDIOLOGY | Facility: CLINIC | Age: 78
End: 2018-01-03

## 2018-01-03 ENCOUNTER — COMMUNICATION - HEALTHEAST (OUTPATIENT)
Dept: INTERNAL MEDICINE | Facility: CLINIC | Age: 78
End: 2018-01-03

## 2018-01-03 DIAGNOSIS — I48.3 TYPICAL ATRIAL FLUTTER (H): ICD-10-CM

## 2018-01-03 LAB — INR PPP: 3.5 (ref 0.9–1.1)

## 2018-01-08 ENCOUNTER — AMBULATORY - HEALTHEAST (OUTPATIENT)
Dept: CARDIOLOGY | Facility: CLINIC | Age: 78
End: 2018-01-08

## 2018-01-08 DIAGNOSIS — I48.3 TYPICAL ATRIAL FLUTTER (H): ICD-10-CM

## 2018-01-08 LAB — INTERNATIONAL NORMALIZATION RATIO, POC - HISTORICAL: 2.2

## 2018-01-09 ENCOUNTER — COMMUNICATION - HEALTHEAST (OUTPATIENT)
Dept: SCHEDULING | Facility: CLINIC | Age: 78
End: 2018-01-09

## 2018-01-09 ENCOUNTER — AMBULATORY - HEALTHEAST (OUTPATIENT)
Dept: INTERNAL MEDICINE | Facility: CLINIC | Age: 78
End: 2018-01-09

## 2018-01-09 ENCOUNTER — COMMUNICATION - HEALTHEAST (OUTPATIENT)
Dept: INTERNAL MEDICINE | Facility: CLINIC | Age: 78
End: 2018-01-09

## 2018-01-09 ENCOUNTER — OFFICE VISIT - HEALTHEAST (OUTPATIENT)
Dept: INTERNAL MEDICINE | Facility: CLINIC | Age: 78
End: 2018-01-09

## 2018-01-09 DIAGNOSIS — K85.90 PANCREATITIS: ICD-10-CM

## 2018-01-09 DIAGNOSIS — E11.9 DIABETES (H): ICD-10-CM

## 2018-01-09 DIAGNOSIS — R10.9 ABDOMINAL PAIN: ICD-10-CM

## 2018-01-09 LAB
ALBUMIN SERPL-MCNC: 3.7 G/DL (ref 3.5–5)
ALP SERPL-CCNC: 116 U/L (ref 45–120)
ALT SERPL W P-5'-P-CCNC: 23 U/L (ref 0–45)
ANION GAP SERPL CALCULATED.3IONS-SCNC: 14 MMOL/L (ref 5–18)
AST SERPL W P-5'-P-CCNC: 14 U/L (ref 0–40)
BILIRUB DIRECT SERPL-MCNC: 0.1 MG/DL
BILIRUB SERPL-MCNC: 0.4 MG/DL (ref 0–1)
BUN SERPL-MCNC: 30 MG/DL (ref 8–28)
CALCIUM SERPL-MCNC: 10.4 MG/DL (ref 8.5–10.5)
CHLORIDE BLD-SCNC: 101 MMOL/L (ref 98–107)
CHOLEST SERPL-MCNC: 170 MG/DL
CO2 SERPL-SCNC: 25 MMOL/L (ref 22–31)
CREAT SERPL-MCNC: 8.28 MG/DL (ref 0.7–1.3)
ERYTHROCYTE [DISTWIDTH] IN BLOOD BY AUTOMATED COUNT: 11.5 % (ref 11–14.5)
FASTING STATUS PATIENT QL REPORTED: NORMAL
GFR SERPL CREATININE-BSD FRML MDRD: 6 ML/MIN/1.73M2
GLUCOSE BLD-MCNC: 72 MG/DL (ref 70–125)
HBA1C MFR BLD: 5.2 % (ref 3.5–6)
HCT VFR BLD AUTO: 32 % (ref 40–54)
HDLC SERPL-MCNC: 87 MG/DL
HGB BLD-MCNC: 10.5 G/DL (ref 14–18)
LDLC SERPL CALC-MCNC: 69 MG/DL
LIPASE SERPL-CCNC: 77 U/L (ref 0–52)
MCH RBC QN AUTO: 32.2 PG (ref 27–34)
MCHC RBC AUTO-ENTMCNC: 32.7 G/DL (ref 32–36)
MCV RBC AUTO: 98 FL (ref 80–100)
PLATELET # BLD AUTO: 247 THOU/UL (ref 140–440)
PMV BLD AUTO: 8.1 FL (ref 7–10)
POTASSIUM BLD-SCNC: 5.5 MMOL/L (ref 3.5–5)
PROT SERPL-MCNC: 7.8 G/DL (ref 6–8)
RBC # BLD AUTO: 3.26 MILL/UL (ref 4.4–6.2)
SODIUM SERPL-SCNC: 140 MMOL/L (ref 136–145)
TRIGL SERPL-MCNC: 70 MG/DL
WBC: 5.6 THOU/UL (ref 4–11)

## 2018-01-09 ASSESSMENT — MIFFLIN-ST. JEOR: SCORE: 1352.63

## 2018-01-11 ENCOUNTER — COMMUNICATION - HEALTHEAST (OUTPATIENT)
Dept: CARDIOLOGY | Facility: CLINIC | Age: 78
End: 2018-01-11

## 2018-01-11 ENCOUNTER — COMMUNICATION - HEALTHEAST (OUTPATIENT)
Dept: INTERNAL MEDICINE | Facility: CLINIC | Age: 78
End: 2018-01-11

## 2018-01-11 DIAGNOSIS — I10 HTN (HYPERTENSION): ICD-10-CM

## 2018-01-11 DIAGNOSIS — I48.20 CHRONIC ATRIAL FIBRILLATION (H): ICD-10-CM

## 2018-01-12 ENCOUNTER — COMMUNICATION - HEALTHEAST (OUTPATIENT)
Dept: FAMILY MEDICINE | Facility: CLINIC | Age: 78
End: 2018-01-12

## 2018-01-16 ENCOUNTER — HOSPITAL ENCOUNTER (OUTPATIENT)
Dept: CT IMAGING | Facility: CLINIC | Age: 78
Discharge: HOME | End: 2018-01-16
Attending: INTERNAL MEDICINE

## 2018-01-16 DIAGNOSIS — R10.9 ABDOMINAL PAIN: ICD-10-CM

## 2018-01-18 ENCOUNTER — HOSPITAL ENCOUNTER (OUTPATIENT)
Dept: INTERVENTIONAL RADIOLOGY/VASCULAR | Facility: CLINIC | Age: 78
Discharge: HOME | End: 2018-01-18
Attending: INTERNAL MEDICINE

## 2018-01-18 DIAGNOSIS — N19 RENAL FAILURE: ICD-10-CM

## 2018-01-18 DIAGNOSIS — R79.1 PROLONGED BLEEDING TIME: ICD-10-CM

## 2018-01-18 LAB
HGB BLD-MCNC: 11.1 G/DL (ref 14–18)
INR PPP: 1.11 (ref 0.9–1.1)
PLATELET # BLD AUTO: 268 THOU/UL (ref 140–440)
POTASSIUM BLD-SCNC: 5.5 MMOL/L (ref 3.5–5)

## 2018-01-22 ENCOUNTER — AMBULATORY - HEALTHEAST (OUTPATIENT)
Dept: CARDIOLOGY | Facility: CLINIC | Age: 78
End: 2018-01-22

## 2018-01-22 DIAGNOSIS — I48.3 TYPICAL ATRIAL FLUTTER (H): ICD-10-CM

## 2018-01-22 LAB — INTERNATIONAL NORMALIZATION RATIO, POC - HISTORICAL: 1.4

## 2018-01-29 ENCOUNTER — AMBULATORY - HEALTHEAST (OUTPATIENT)
Dept: CARDIOLOGY | Facility: CLINIC | Age: 78
End: 2018-01-29

## 2018-01-29 DIAGNOSIS — I48.3 TYPICAL ATRIAL FLUTTER (H): ICD-10-CM

## 2018-01-29 DIAGNOSIS — I48.91 A-FIB (H): ICD-10-CM

## 2018-01-29 LAB — INTERNATIONAL NORMALIZATION RATIO, POC - HISTORICAL: 2.4

## 2018-02-01 ENCOUNTER — RECORDS - HEALTHEAST (OUTPATIENT)
Dept: ADMINISTRATIVE | Facility: OTHER | Age: 78
End: 2018-02-01

## 2018-02-06 ENCOUNTER — OFFICE VISIT - HEALTHEAST (OUTPATIENT)
Dept: INTERNAL MEDICINE | Facility: CLINIC | Age: 78
End: 2018-02-06

## 2018-02-06 DIAGNOSIS — E11.29 TYPE 2 DIABETES MELLITUS WITH KIDNEY COMPLICATION (H): ICD-10-CM

## 2018-02-06 DIAGNOSIS — N28.1 CYST OF KIDNEY, ACQUIRED: ICD-10-CM

## 2018-02-06 ASSESSMENT — MIFFLIN-ST. JEOR: SCORE: 1343.56

## 2018-02-12 ENCOUNTER — COMMUNICATION - HEALTHEAST (OUTPATIENT)
Dept: INTERNAL MEDICINE | Facility: CLINIC | Age: 78
End: 2018-02-12

## 2018-02-12 ENCOUNTER — AMBULATORY - HEALTHEAST (OUTPATIENT)
Dept: INTERNAL MEDICINE | Facility: CLINIC | Age: 78
End: 2018-02-12

## 2018-02-12 DIAGNOSIS — G62.9 NEUROPATHY: ICD-10-CM

## 2019-01-01 ENCOUNTER — OFFICE VISIT - HEALTHEAST (OUTPATIENT)
Dept: INTERNAL MEDICINE | Facility: CLINIC | Age: 79
End: 2019-01-01

## 2019-01-01 ENCOUNTER — COMMUNICATION - HEALTHEAST (OUTPATIENT)
Dept: SCHEDULING | Facility: CLINIC | Age: 79
End: 2019-01-01

## 2019-01-01 ENCOUNTER — COMMUNICATION - HEALTHEAST (OUTPATIENT)
Dept: INTERNAL MEDICINE | Facility: CLINIC | Age: 79
End: 2019-01-01

## 2019-01-01 ENCOUNTER — RECORDS - HEALTHEAST (OUTPATIENT)
Dept: ADMINISTRATIVE | Facility: OTHER | Age: 79
End: 2019-01-01

## 2019-01-01 ENCOUNTER — AMBULATORY - HEALTHEAST (OUTPATIENT)
Dept: GERIATRICS | Facility: CLINIC | Age: 79
End: 2019-01-01

## 2019-01-01 ENCOUNTER — HOME CARE/HOSPICE - HEALTHEAST (OUTPATIENT)
Dept: HOME HEALTH SERVICES | Facility: HOME HEALTH | Age: 79
End: 2019-01-01

## 2019-01-01 ENCOUNTER — COMMUNICATION - HEALTHEAST (OUTPATIENT)
Dept: CARDIOLOGY | Facility: CLINIC | Age: 79
End: 2019-01-01

## 2019-01-01 ENCOUNTER — AMBULATORY - HEALTHEAST (OUTPATIENT)
Dept: CARDIOLOGY | Facility: CLINIC | Age: 79
End: 2019-01-01

## 2019-01-01 DIAGNOSIS — Z01.818 PREOPERATIVE EXAMINATION: ICD-10-CM

## 2019-01-01 DIAGNOSIS — I48.91 A-FIB (H): ICD-10-CM

## 2019-01-01 DIAGNOSIS — I48.91 ATRIAL FIBRILLATION (H): ICD-10-CM

## 2019-01-01 DIAGNOSIS — I48.3 TYPICAL ATRIAL FLUTTER (H): ICD-10-CM

## 2019-01-01 LAB
ALBUMIN SERPL-MCNC: 3.2 G/DL (ref 3.5–5)
ALP SERPL-CCNC: 124 U/L (ref 45–120)
ALT SERPL W P-5'-P-CCNC: 14 U/L (ref 0–45)
ANION GAP SERPL CALCULATED.3IONS-SCNC: 16 MMOL/L (ref 5–18)
AST SERPL W P-5'-P-CCNC: 14 U/L (ref 0–40)
BILIRUB SERPL-MCNC: 0.4 MG/DL (ref 0–1)
BUN SERPL-MCNC: 43 MG/DL (ref 8–28)
CALCIUM SERPL-MCNC: 10.3 MG/DL (ref 8.5–10.5)
CHLORIDE BLD-SCNC: 102 MMOL/L (ref 98–107)
CO2 SERPL-SCNC: 28 MMOL/L (ref 22–31)
CREAT SERPL-MCNC: 9.02 MG/DL (ref 0.7–1.3)
ERYTHROCYTE [DISTWIDTH] IN BLOOD BY AUTOMATED COUNT: 17.5 % (ref 11–14.5)
GFR SERPL CREATININE-BSD FRML MDRD: 6 ML/MIN/1.73M2
GLUCOSE BLD-MCNC: 109 MG/DL (ref 70–125)
HCT VFR BLD AUTO: 36.4 % (ref 40–54)
HGB BLD-MCNC: 11.2 G/DL (ref 14–18)
INTERNATIONAL NORMALIZATION RATIO, POC - HISTORICAL: 2.3
MCH RBC QN AUTO: 27.5 PG (ref 27–34)
MCHC RBC AUTO-ENTMCNC: 30.8 G/DL (ref 32–36)
MCV RBC AUTO: 89 FL (ref 80–100)
PLATELET # BLD AUTO: 243 THOU/UL (ref 140–440)
PMV BLD AUTO: 8.5 FL (ref 7–10)
POTASSIUM BLD-SCNC: 4.8 MMOL/L (ref 3.5–5)
PROT SERPL-MCNC: 7.7 G/DL (ref 6–8)
RBC # BLD AUTO: 4.08 MILL/UL (ref 4.4–6.2)
SODIUM SERPL-SCNC: 146 MMOL/L (ref 136–145)
URATE SERPL-MCNC: 5.2 MG/DL (ref 3–8)
WBC: 6.4 THOU/UL (ref 4–11)

## 2019-01-01 ASSESSMENT — MIFFLIN-ST. JEOR: SCORE: 1366.24

## 2019-03-21 ENCOUNTER — AMBULATORY - HEALTHEAST (OUTPATIENT)
Dept: CARDIOLOGY | Facility: CLINIC | Age: 79
End: 2019-03-21

## 2019-03-21 DIAGNOSIS — I48.91 ATRIAL FIBRILLATION (H): ICD-10-CM

## 2019-03-21 DIAGNOSIS — I48.3 TYPICAL ATRIAL FLUTTER (H): ICD-10-CM

## 2019-04-18 ENCOUNTER — COMMUNICATION - HEALTHEAST (OUTPATIENT)
Dept: INTERNAL MEDICINE | Facility: CLINIC | Age: 79
End: 2019-04-18

## 2019-04-18 DIAGNOSIS — Z00.00 ROUTINE GENERAL MEDICAL EXAMINATION AT A HEALTH CARE FACILITY: ICD-10-CM

## 2019-06-03 ENCOUNTER — COMMUNICATION - HEALTHEAST (OUTPATIENT)
Dept: CARDIOLOGY | Facility: CLINIC | Age: 79
End: 2019-06-03

## 2021-05-24 ENCOUNTER — RECORDS - HEALTHEAST (OUTPATIENT)
Dept: ADMINISTRATIVE | Facility: CLINIC | Age: 81
End: 2021-05-24

## 2021-05-25 ENCOUNTER — RECORDS - HEALTHEAST (OUTPATIENT)
Dept: ADMINISTRATIVE | Facility: CLINIC | Age: 81
End: 2021-05-25

## 2021-05-26 ENCOUNTER — RECORDS - HEALTHEAST (OUTPATIENT)
Dept: ADMINISTRATIVE | Facility: CLINIC | Age: 81
End: 2021-05-26

## 2021-05-27 ENCOUNTER — RECORDS - HEALTHEAST (OUTPATIENT)
Dept: ADMINISTRATIVE | Facility: CLINIC | Age: 81
End: 2021-05-27

## 2021-05-28 ENCOUNTER — RECORDS - HEALTHEAST (OUTPATIENT)
Dept: ADMINISTRATIVE | Facility: CLINIC | Age: 81
End: 2021-05-28

## 2021-05-28 NOTE — TELEPHONE ENCOUNTER
RN cannot approve Refill Request    RN can NOT refill this medication med is not covered by policy/route to provider. Last office visit: 12/27/2018 Cuco Bennett MD Last Physical: 1/22/2019 Last MTM visit: Visit date not found Last visit same specialty: 12/27/2018 Cuco Bennett MD.  Next visit within 3 mo: Visit date not found  Next physical within 3 mo: Visit date not found      Christina Perez, [facility] Triage/Med Refill 4/18/2019    Requested Prescriptions   Pending Prescriptions Disp Refills     NIASPAN EXTENDED-RELEASE 500 mg CR tablet [Pharmacy Med Name: NIASPAN  500MG  TAB  EXTENDED RELEASE] 90 tablet      Sig: TAKE 1 TABLET BY MOUTH  DAILY WITH FOOD       There is no refill protocol information for this order

## 2021-05-29 ENCOUNTER — RECORDS - HEALTHEAST (OUTPATIENT)
Dept: ADMINISTRATIVE | Facility: CLINIC | Age: 81
End: 2021-05-29

## 2021-05-29 NOTE — TELEPHONE ENCOUNTER
----- Message from Lona Witt sent at 5/31/2019  2:08 PM CDT -----  Per son this patient passed away at the end of February, 2019.  6MO FU W/ CLL PER 10/11/18 OV.  Please note chart.  Thanks Lona

## 2021-05-30 VITALS — HEIGHT: 66 IN | WEIGHT: 157 LBS | BODY MASS INDEX: 25.23 KG/M2

## 2021-05-30 VITALS — HEIGHT: 66 IN | WEIGHT: 161 LBS | BODY MASS INDEX: 25.88 KG/M2

## 2021-05-30 VITALS — HEIGHT: 69 IN | BODY MASS INDEX: 23.7 KG/M2 | WEIGHT: 160 LBS

## 2021-05-30 VITALS — BODY MASS INDEX: 24.64 KG/M2 | HEIGHT: 67 IN | WEIGHT: 157 LBS

## 2021-05-30 VITALS — HEIGHT: 67 IN | BODY MASS INDEX: 24.8 KG/M2 | WEIGHT: 158 LBS

## 2021-05-30 VITALS — HEIGHT: 67 IN | BODY MASS INDEX: 23.86 KG/M2 | WEIGHT: 152 LBS

## 2021-05-30 VITALS — WEIGHT: 154.2 LBS | BODY MASS INDEX: 24.78 KG/M2 | HEIGHT: 66 IN

## 2021-05-30 VITALS — WEIGHT: 158 LBS | BODY MASS INDEX: 25.39 KG/M2 | HEIGHT: 66 IN

## 2021-05-31 ENCOUNTER — RECORDS - HEALTHEAST (OUTPATIENT)
Dept: ADMINISTRATIVE | Facility: CLINIC | Age: 81
End: 2021-05-31

## 2021-05-31 VITALS — BODY MASS INDEX: 23.86 KG/M2 | HEIGHT: 67 IN | WEIGHT: 152 LBS

## 2021-05-31 VITALS — WEIGHT: 150 LBS | BODY MASS INDEX: 22.22 KG/M2 | HEIGHT: 69 IN

## 2021-05-31 VITALS — BODY MASS INDEX: 24.64 KG/M2 | WEIGHT: 157 LBS | HEIGHT: 67 IN

## 2021-05-31 VITALS — HEIGHT: 67 IN | BODY MASS INDEX: 24.89 KG/M2 | WEIGHT: 158.6 LBS

## 2021-05-31 VITALS — BODY MASS INDEX: 23.39 KG/M2 | WEIGHT: 149 LBS | HEIGHT: 67 IN

## 2021-05-31 VITALS — WEIGHT: 149.25 LBS | HEIGHT: 70 IN | BODY MASS INDEX: 21.37 KG/M2

## 2021-05-31 VITALS — WEIGHT: 156 LBS | HEIGHT: 67 IN | BODY MASS INDEX: 24.48 KG/M2

## 2021-05-31 VITALS — WEIGHT: 150.1 LBS | BODY MASS INDEX: 22.23 KG/M2 | HEIGHT: 69 IN

## 2021-05-31 VITALS — BODY MASS INDEX: 24.01 KG/M2 | HEIGHT: 67 IN | WEIGHT: 153 LBS

## 2021-05-31 VITALS — BODY MASS INDEX: 23.7 KG/M2 | HEIGHT: 67 IN | WEIGHT: 151 LBS

## 2021-06-01 VITALS — BODY MASS INDEX: 24.37 KG/M2 | WEIGHT: 155.6 LBS

## 2021-06-01 VITALS — WEIGHT: 157 LBS | BODY MASS INDEX: 23.18 KG/M2

## 2021-06-01 VITALS — HEIGHT: 69 IN | WEIGHT: 154 LBS | BODY MASS INDEX: 22.81 KG/M2

## 2021-06-01 VITALS — BODY MASS INDEX: 25.53 KG/M2 | WEIGHT: 163 LBS

## 2021-06-01 VITALS — WEIGHT: 161.2 LBS | BODY MASS INDEX: 23.81 KG/M2

## 2021-06-01 VITALS — HEIGHT: 69 IN | WEIGHT: 158 LBS | BODY MASS INDEX: 23.4 KG/M2

## 2021-06-01 VITALS — WEIGHT: 157 LBS | BODY MASS INDEX: 24.64 KG/M2 | HEIGHT: 67 IN

## 2021-06-01 VITALS — WEIGHT: 161 LBS | HEIGHT: 67 IN | BODY MASS INDEX: 25.27 KG/M2

## 2021-06-01 VITALS — WEIGHT: 163 LBS | BODY MASS INDEX: 25.53 KG/M2

## 2021-06-01 VITALS — HEIGHT: 67 IN | BODY MASS INDEX: 23.39 KG/M2 | WEIGHT: 149 LBS

## 2021-06-01 VITALS — BODY MASS INDEX: 24.59 KG/M2 | WEIGHT: 157 LBS

## 2021-06-01 VITALS — WEIGHT: 154.4 LBS | BODY MASS INDEX: 24.18 KG/M2

## 2021-06-01 VITALS — BODY MASS INDEX: 24.48 KG/M2 | HEIGHT: 67 IN | WEIGHT: 156 LBS

## 2021-06-01 VITALS — BODY MASS INDEX: 24.68 KG/M2 | WEIGHT: 157.6 LBS

## 2021-06-01 VITALS — BODY MASS INDEX: 23.95 KG/M2 | WEIGHT: 162.2 LBS

## 2021-06-01 VITALS — BODY MASS INDEX: 24.18 KG/M2 | WEIGHT: 154.4 LBS

## 2021-06-01 VITALS — BODY MASS INDEX: 23.7 KG/M2 | WEIGHT: 151 LBS | HEIGHT: 67 IN

## 2021-06-02 VITALS — BODY MASS INDEX: 22.67 KG/M2 | WEIGHT: 153.1 LBS | HEIGHT: 69 IN

## 2021-06-02 VITALS — WEIGHT: 151 LBS | HEIGHT: 69 IN | BODY MASS INDEX: 22.36 KG/M2

## 2021-06-02 VITALS — WEIGHT: 152 LBS | HEIGHT: 69 IN | BODY MASS INDEX: 22.51 KG/M2

## 2021-06-02 VITALS — BODY MASS INDEX: 22.07 KG/M2 | HEIGHT: 69 IN | WEIGHT: 149 LBS

## 2021-06-02 VITALS — WEIGHT: 155.3 LBS | HEIGHT: 70 IN | BODY MASS INDEX: 22.23 KG/M2

## 2021-06-08 NOTE — PROGRESS NOTES
Continue current management dosing of Warfarin. Continue  diet of moderate Vitamin K intake. Discussed with pt the need to call with questions or concerns or any change in medication especially herbal medication or OTC. Call with increased bleeding or bruising or any upcoming procedures.  Increase greens

## 2021-06-08 NOTE — PROGRESS NOTES
1940  [phone] (home)    +++Important patient information for [facility]/Cath Lab staff : PT IS A DIALYSIS PT, AND IS DIABETIC NO MEDS+++    [facility] EP Cath Lab Procedure Order   Cardioversion:  Cardioversion   PT'S INR'S ARE IN EPIC UNDER LAB TAB    2/6=3.1  1/30=3.4  1/23=3.2  1/16=2.5    Diagnosis:  AF  Anticipated Case Duration:  Standard  Scheduling Needs/Timeframe:  PT IS SET FOR THURS 2/9/2017 AT 12 00    Current Device: None None  Device Company/Device Rep Needed for Procedure: None    Anesthesia:  General-CV Only  Research Protocol:  No    [facility] EP Cath Lab Prep   Ordering Provider: Jenny Lion NP  Ordering Date: 1/16/2017  Orders Status: Intial order placed and Order set placed  EP NC Contact: Nette Watson LPN    H&P:  Compled by PMD DR ESTRADA on 2/2/2017  PCP: Cuco Estrada MD, [phone]    Pre-op Labs: N/A for procedure    Medical Records Pertinent for Procedure:  N/A    Patient Education:    PT HAS A  FOR PROCEDURE  PT'S INSTRUCTIONS ALL REVIEWED WITH SON ROBERTO  PT INSTRUCTED TO HOLD ANY VITAMINS, MINERALS, CALCIUM, IRON OR SUPPLEMENTS THE MORNING OF CV  PT PER JENNY IS HOLD HOLD DIGOXIN THE MORNING OF CV  PT INSTRUCTED PER JENNY TO TAKE 100 MG OF HIS METOPROLOL THE  MORNING OF CV  PT IS TO HAVE FOLLOW UP APPT WITH JENNY IN 3 WEEKS AND  NOTIFIED  PT IS DIABETIC NO MEDS  PT GETS DIALYSIS ON FRIDAY'S  Teach with Patient: Completed via phone on WITH PT'S SON ROBERTO 2/6/2017    Risks Reviewed:     Cardioversion    >90% acute success rate, <10% failure to convert or   reverts shortly after cardioversion.    <1% embolic event of (CVA, pulmonary embolism, or   other site).    75% risk for superficial burn.  Risks associated with general anesthesia will be addressed by the Anesthesiology Department    Consent: Will be obtained in [facility] day of procedure    Pre-Procedure Instructions that were Reviewed with Patient:  NPO after midnight, Notified patient of time and date of procedure by CV  , Transportation arrangements needed s/p procedure, Post-procedure follow up process and Sedation plan/orders    Pre-Procedure Medication Instructions:  Instructions given to pt regarding anticoagulants: Coumadin- instructed to continue anticoagulation uninterrupted through their procedure  Instructions given to pt regarding antiarrhythmic medication: Beta Blocker; Pt instructed to continue medication prior to procedure  Instructions for medication, other than anticoagulants/antiarrhythmics listed above, given to pt: to take all morning medications with small sips of water, with the exception of OTC supplements and MVI    Allergies   Allergen Reactions     Contrast [Iohexol]      Contrast Dye     Iodine Hives     And convulsions from ivp dye     Lactose Diarrhea     Scallops Nausea And Vomiting       Current Outpatient Prescriptions:      aspirin 81 MG tablet, Take 81 mg by mouth daily., Disp: , Rfl:      atorvastatin (LIPITOR) 80 MG tablet, Take 80 mg by mouth bedtime., Disp: , Rfl:      cinacalcet (SENSIPAR) 30 MG tablet, Take 30 mg by mouth every evening., Disp: , Rfl:      cloNIDine HCl (CATAPRES) 0.1 MG tablet, Take 1 tablet by mouth  twice a day, Disp: 180 tablet, Rfl: 5     digoxin 62.5 mcg Tab, Take 62.5 mcg by mouth daily., Disp: 90 tablet, Rfl: 3     furosemide (LASIX) 80 MG tablet, Take 80 mg by mouth 2 (two) times a day at 9am and 6pm., Disp: , Rfl:      isosorbide mononitrate (IMDUR) 30 MG 24 hr tablet, Take 1 tablet (30 mg total) by mouth daily., Disp: 90 tablet, Rfl: 3     metoprolol tartrate (LOPRESSOR) 100 MG tablet, Take 2 tablets (200 mg total) by mouth 2 (two) times a day., Disp: 360 tablet, Rfl: 1     minoxidil (LONITEN) 2.5 MG tablet, Take 2.5 mg by mouth daily., Disp: , Rfl:      multivitamin (MULTIVITAMIN) per tablet, Take 1 tablet by mouth daily., Disp: , Rfl:      NIASPAN EXTENDED-RELEASE 500 mg CR tablet, Take 1 tablet by mouth  daily with food, Disp: 90 tablet, Rfl: 5      psyllium (METAMUCIL) 0.52 gram capsule, Take 0.52 g by mouth daily., Disp: , Rfl:      warfarin (COUMADIN) 5 MG tablet, Take 5 mg by mouth See Admin Instructions. Sun-Thurs, Disp: , Rfl:      warfarin (COUMADIN) 5 MG tablet, Take 7.5 mg by mouth See Admin Instructions. 7.5mg [id number]-Fpy-Fri-Sat, Disp: , Rfl:

## 2021-06-08 NOTE — PROGRESS NOTES
Pt going for CV on 1/17, c/o sob to CMA doing INR. Instructed to see PCP or see CADENCE otoole. Will continue current dose of Warfarin and have CV as planned on 1/17. Continue current dosing of Warfarin. Continue current diet of moderate Vitamin K intake. Call with questions or concerns or any change in medication. Call with increased bleeding or bruising or any upcoming procedures.

## 2021-06-08 NOTE — PROGRESS NOTES
Office Visit - Physical    Manuel Marrero   76 y.o. male    Date of Visit: 2/2/2017    Chief Complaint   Patient presents with     Pre-op Exam     Cardioversion 2/9/2017 at [facility]       Subjective: Preop.    76-year-old male In by his son Manuel here for preoperative evaluation anticipation of cardioversion a week from today for brain 9/20/17 [facility].    The patient feels better he has had a diagnostic cardiac atrophy and was felt not to be a candidate for carotid bypass grafting or percutaneous coronary intervention at that time.  The patient had severe three-vessel disease and treated medically.  The patient has a compensated past medical history centering around end-stage renal disease on hemodialysis for at least 9 years 3 times per week under Dr. ronnie younger [facility].    Nonsmoker.    No excess alcohol.    No known drug allergies.    Recent CT scan of the abdomen showed cholelithiasis.    Colonoscopy dated October 31 23rd 18 showed diverticulosis biopsies were done negative.  Dr. Patel.    ROS: A comprehensive review of systems was performed and was otherwise negative    Medications:   Prior to Admission medications    Medication Sig Start Date End Date Taking? Authorizing Provider   aspirin 81 MG tablet Take 81 mg by mouth daily.   Yes PROVIDER, HISTORICAL   atorvastatin (LIPITOR) 80 MG tablet Take 80 mg by mouth bedtime.   Yes PROVIDER, HISTORICAL   cinacalcet (SENSIPAR) 30 MG tablet Take 30 mg by mouth every evening.   Yes PROVIDER, HISTORICAL   cloNIDine HCl (CATAPRES) 0.1 MG tablet Take 1 tablet by mouth  twice a day 12/12/16  Yes Cuco Bennett MD   digoxin 62.5 mcg Tab Take 62.5 mcg by mouth daily. 1/14/17  Yes Cuco Bennett MD   furosemide (LASIX) 80 MG tablet Take 80 mg by mouth 2 (two) times a day at 9am and 6pm.   Yes PROVIDER, HISTORICAL   isosorbide mononitrate (IMDUR) 30 MG 24 hr tablet Take 1 tablet (30 mg total) by mouth daily. 1/14/17  Yes Cuco Bennett MD    metoprolol tartrate (LOPRESSOR) 100 MG tablet Take 2 tablets (200 mg total) by mouth 2 (two) times a day. 16  Yes Jenny Lion CNP   minoxidil (LONITEN) 2.5 MG tablet Take 2.5 mg by mouth daily.   Yes PROVIDER, HISTORICAL   multivitamin (MULTIVITAMIN) per tablet Take 1 tablet by mouth daily.   Yes PROVIDER, HISTORICAL   NIASPAN EXTENDED-RELEASE 500 mg CR tablet Take 1 tablet by mouth  daily with food 12/29/15  Yes Cuco Bennett MD   psyllium (METAMUCIL) 0.52 gram capsule Take 0.52 g by mouth daily.   Yes PROVIDER, HISTORICAL   warfarin (COUMADIN) 5 MG tablet Take 5 mg by mouth See Admin Instructions. Sun-Th   Yes PROVIDER, HISTORICAL   warfarin (COUMADIN) 5 MG tablet Take 7.5 mg by mouth See Admin Instructions. 7.5mg Sgn-Ptnq-Yox-Fri-Sat   Yes PROVIDER, HISTORICAL       Allergies:  Allergies   Allergen Reactions     Contrast [Iohexol]      Contrast Dye     Iodine Hives     And convulsions from ivp dye     Lactose Diarrhea     Scallops Nausea And Vomiting       Immunizations:   Immunization History   Administered Date(s) Administered     Influenza, inj, historic 2007, 10/09/2008     Pneumo Conj 13-V (2010&after) 2016     Pneumo Polysac 23-V 2006     Td, historic 06/15/2006     Tdap 2016     ZOSTER 2016       Health Maintenance: Immunizations reviewed up-to-date.    Colonoscopy as above.    Past Medical History: Hypertension.    Hyperlipidemia.    Hyperglycemia by history.    End stage renal disease on hemodialysis 3 times per week for the last 9 years.    Past Surgical History: Prostatectomy for prostate cancer  no recurrence.    No history of chemotherapy or radiation therapy for prostate cancer.    Family History: Mother  62 cerebral hemorrhage.    Father  age 80 of uncertain cause.    Wife a former patient of this examiner  at age 61 of lung cancer should been a heavy smoker.    2 sons in their early 50s late 40s good health Manuel the sun very  supportive.    Social History: Hemodialysis 3 times a week.  Retired.    Exam Chest clear to auscultation and percussion.  Heart tones regular rhythm without murmur rub or gallop.  Abdomen soft nontender no organomegaly.  No peritoneal signs.  Extremities free of edema cyanosis or clubbing.  Neck veins nondistended no thyromegaly or scleral icterus noted, carotids full.  Skin warm and dry easily conversant good spirited.  Normal intelligence.  Neurologically intact no gross localizing findings.      Assessment and Plan  Atrial fibrillation flutter with rapid ventricular response needs cardioversion preoperatively check hemoglobin and potassium level today.    Hypertension and hyperlipidemia and end-stage renal disease on hemodialysis ×9 years.    Total time spent with the patient today was 40 minutes of which greater than 50% was spent in counseling and coordination of care.    Cuco Bennett MD    Patient Active Problem List   Diagnosis     Aneurysm Of The Abdominal Aorta     Type 2 diabetes mellitus with kidney complication     Chronic Renal Failure With Uremic Nephropathy     Vascular dementia     Depression     Lactose intolerance     Unstable angina     Peripheral artery disease     Typical atrial flutter     Essential hypertension     Hyperlipidemia LDL goal <70     Anemia     Allergy to iodine compound     Atherosclerosis of aorta     ESRD (end stage renal disease) on dialysis     Coronary artery disease due to calcified coronary lesion

## 2021-06-08 NOTE — ANESTHESIA POSTPROCEDURE EVALUATION
Patient: Manuel Marrero  * No procedures listed *  Anesthesia type: general    Patient location: Telemetry/Step Down Unit  Last vitals:   Vitals:    02/09/17 1430   BP: 156/70   Pulse: 68   Resp: 13   Temp:    SpO2: 97%     Post vital signs: stable  Level of consciousness: awake, alert and oriented  Post-anesthesia pain: pain controlled  Post-anesthesia nausea and vomiting: no  Pulmonary: unassisted, return to baseline  Cardiovascular: stable and blood pressure at baseline  Hydration: adequate  Anesthetic events: no    QCDR Measures:  ASA# 11 - [place]-op Cardiac Arrest: ASA11B - Patient did NOT experience unanticipated cardiac arrest  ASA# 12 - [place]-op Mortality Rate: ASA12B - Patient did NOT die  ASA# 13 - PACU Re-Intubation Rate: NA - No ETT / LMA used for case  ASA# 10 - Composite Anes Safety: ASA10A - No serious adverse event  ASA# 38 - New Corneal Injury: ASA38A - No new exposure keratitis or corneal abrasion in PACU    Additional Notes:  Infiltrated IV site Left AC looks good, soft, no induration. No other problems with anesthesia.

## 2021-06-08 NOTE — PROGRESS NOTES
INR 3.4 will decrease Warfarin to 5 mg T,Th,Sat and retest in one week for CV. After talking with pt and discussing history of greens/salads and medication change. Pt will  continue  with current diet and dosing of Warfarin.  Continue with moderation of Vit K and green leafy vegetables. Cautioned to call with increase bruising or bleeding. Reminded to call with medication change especially antibiotic. Call with any questions or concerns or any up coming procedures. Cautioned about using Herbal medication.

## 2021-06-08 NOTE — PROGRESS NOTES
Office Visit - Follow up    Manuel Marrero   76 y.o. male    Date of Visit: 1/16/2017    Chief Complaint   Patient presents with     Hospital Visit Follow Up     shaky weak     Diarrhea     after starting new medication       Subjective: Coronary artery disease.  With diabetes mellitus type 2 hypertension and hyperlipidemia.  End stage renal disease on hemodialysis.    Hospital follow-up feels shaky and weak.  Diarrhea for a few days after starting new meds discussed probiotics and cultured yogurt.  Rapid heart rate in the morning until he takes his medications than the heart rate slows down.  Usually now after meds less than 100.  Accompanied by his son who is very supportive another son also in the family.    No blood in stool or urine.  Medication list reviewed generally feels better has no chest pain with standing or with any activity the medications have helped.  Coronary arteriography diagnostic showed severe three-vessel disease with heavy calcified load of atherosclerotic plaque the patient is weak from multiple medical comorbidities and not an optimal candidate either for open heart surgery or percutaneous intervention.  The patient was made aware of this and a ashlee discussion.    No blood in stool or urine or sputum    Medication list reviewed some meds are causing some looseness and stools reviewed.  They are new part of his an anginal program.    ROS: A comprehensive review of systems was performed and was otherwise negative    Medications:  Prior to Admission medications    Medication Sig Start Date End Date Taking? Authorizing Provider   aspirin 81 MG tablet Take 81 mg by mouth daily.   Yes PROVIDER, HISTORICAL   atorvastatin (LIPITOR) 80 MG tablet Take 80 mg by mouth bedtime.   Yes PROVIDER, HISTORICAL   cinacalcet (SENSIPAR) 30 MG tablet Take 30 mg by mouth every evening.   Yes PROVIDER, HISTORICAL   cloNIDine HCl (CATAPRES) 0.1 MG tablet Take 1 tablet by mouth  twice a day 12/12/16  Yes Cuco BOURGEOIS  MD Sabrina   digoxin 62.5 mcg Tab Take 62.5 mcg by mouth daily. 1/14/17  Yes Cuco Bennett MD   furosemide (LASIX) 80 MG tablet Take 80 mg by mouth 2 (two) times a day at 9am and 6pm.   Yes PROVIDER, HISTORICAL   isosorbide mononitrate (IMDUR) 30 MG 24 hr tablet Take 1 tablet (30 mg total) by mouth daily. 1/14/17  Yes Cuco Bennett MD   metoprolol tartrate (LOPRESSOR) 100 MG tablet Take 2 tablets (200 mg total) by mouth 2 (two) times a day. 12/19/16  Yes Jenny Lion CNP   minoxidil (LONITEN) 2.5 MG tablet Take 2.5 mg by mouth daily.   Yes PROVIDER, HISTORICAL   multivitamin (MULTIVITAMIN) per tablet Take 1 tablet by mouth daily.   Yes PROVIDER, HISTORICAL   NIASPAN EXTENDED-RELEASE 500 mg CR tablet Take 1 tablet by mouth  daily with food 12/29/15  Yes Cuco Bennett MD   psyllium (METAMUCIL) 0.52 gram capsule Take 0.52 g by mouth daily.   Yes PROVIDER, HISTORICAL   warfarin (COUMADIN) 5 MG tablet Take 5 mg by mouth See Admin Instructions. Sun-Thurs   Yes PROVIDER, HISTORICAL   warfarin (COUMADIN) 5 MG tablet Take 7.5 mg by mouth See Admin Instructions. 7.5mg Jlh-Xkok-Myp-Fri-Sat    PROVIDER, HISTORICAL       Allergies:   Allergies   Allergen Reactions     Contrast [Iohexol]      Contrast Dye     Iodine Hives     And convulsions from ivp dye     Lactose Diarrhea     Scallops Nausea And Vomiting       Immunizations:   Immunization History   Administered Date(s) Administered     Influenza, inj, historic 11/12/2007, 10/09/2008     Pneumo Conj 13-V (2010&after) 05/05/2016     Pneumo Polysac 23-V 04/20/2006     Td, historic 06/15/2006     Tdap 05/05/2016     ZOSTER 05/12/2016       Exam no acute distress Made by his son.    Chest is clear heart tones irregularly irregular apical rate 96.  Abdomen benign extremities free of edema neck veins nondistended he is sitting in a hunched over position he seems to understand more quiet than previously he did smile.    Assessment and Plan   Artery disease with  hypertension hyperlipidemia and diabetes mellitus.    End stage renal disease on hemodialysis.  His coronary artery disease is severe 3 vessel with a heavy atherosclerotic plaque load.  The patient is a high risk candidate for any coronary intervention either coronary bypass grafting or percutaneous coronary intervention if rate control and atrial fibrillation is not converted in helpful for angina.  He may be a candidate for coronary arteriography with angioplasty stent deployment.  He is not a candidate for coronary bypass grafting because of comorbidities and generalized weakness and deconditioning.  An advanced age    Time: total time spent with the patient was 40 minutes of which >50% was spent in counseling and coordination of care    The following high BMI interventions were performed this visit: encouragement to exercise    Cuco Bennett MD    Patient Active Problem List   Diagnosis     Aneurysm Of The Abdominal Aorta     Type 2 diabetes mellitus with kidney complication     Chronic Renal Failure With Uremic Nephropathy     Vascular dementia     Depression     Lactose intolerance     Unstable angina     Peripheral artery disease     Typical atrial flutter     Essential hypertension     Hyperlipidemia LDL goal <70     Anemia     Allergy to iodine compound     Atherosclerosis of aorta     ESRD (end stage renal disease) on dialysis     Coronary artery disease due to calcified coronary lesion

## 2021-06-08 NOTE — PROGRESS NOTES
Weekly for cardioversion, feel today in the bathroom, has an appointment today with PMD, will keep that.  Will continue current dosing and recheck in 1 week.   NIURKA

## 2021-06-08 NOTE — ANESTHESIA CARE TRANSFER NOTE
Last vitals:   Vitals:    02/09/17 1309   BP: 143/87   Pulse: (!) 107   Resp: 14   Temp:    SpO2: 96%     Patient's level of consciousness is drowsy  Spontaneous respirations: yes  Maintains airway independently: yes  Dentition unchanged: yes  Oropharynx: oropharynx clear of all foreign objects    QCDR Measures:  ASA# 20 - Surgical Safety Checklist: ASA20A - Safety Checks Done  PQRS# 430 - Adult PONV Prevention: 4558F - Pt received => 2 anti-emetic agents (different classes) preop & intraop  ASA# 8 - Peds PONV Prevention: NA - Not pediatric patient, not GA or 2 or more risk factors NOT present  PQRS# 424 - [place]-op Temp Management: NA - MAC anesthesia or case < 60 minutes  PQRS# 426 - PACU Transfer Protocol: - Transfer of care checklist used  ASA# 14 - Acute Post-op Pain: ASA14B - Patient did NOT experience pain >= 7 out of 10    I completed my SBAR handoff to the receiving nurse per policy and procedure.

## 2021-06-08 NOTE — PROGRESS NOTES
Pt is in house and was seen by Dr Mitchell today angio ordered.  CV for Tues.1/17/2017 is cancelled and schedulers and Jenny Lion CNP notified.

## 2021-06-08 NOTE — PROGRESS NOTES
Office Visit - Follow up    Manuel Marrero   76 y.o. male    Date of Visit: 1/3/2017    Chief Complaint   Patient presents with     Fall     Fell this AM at home -legs gave out now pain in left ribs and back     Atrial Fibrillation     Hypertension     Diabetes       Subjective: Fall this morning with chest pain left side.  Injured ribs in fall in the bathroom.  History of hypertension diabetes mellitus type 2 end stage renal disease on chronic hemodialysis as well as atrial fibrillation.  Cardioversion is planned for the next week or 2 once INR is in the therapeutic range.  This morning did not hit his head was a loss of consciousness but he did injure his left ribs anterior Darryl laterally left side.  No bruising evident on the skin accompanied by his son difficulty with walking because of rib pain.  There is tenderness here as well see below.    No blood in stool or urine.  Medication list reviewed not short of breath.    ROS: A comprehensive review of systems was performed and was otherwise negative    Medications:  Prior to Admission medications    Medication Sig Start Date End Date Taking? Authorizing Provider   aspirin 81 MG tablet Take 81 mg by mouth daily.   Yes PROVIDER, HISTORICAL   atorvastatin (LIPITOR) 80 MG tablet Take 80 mg by mouth bedtime.   Yes PROVIDER, HISTORICAL   cinacalcet (SENSIPAR) 30 MG tablet Take 30 mg by mouth every evening.   Yes PROVIDER, HISTORICAL   cloNIDine HCl (CATAPRES) 0.1 MG tablet Take 1 tablet by mouth  twice a day 12/12/16  Yes Cuco Bennett MD   furosemide (LASIX) 80 MG tablet Take 2 tablets by mouth  daily 7/31/16  Yes Cuco Bennett MD   metoprolol tartrate (LOPRESSOR) 100 MG tablet Take 2 tablets (200 mg total) by mouth 2 (two) times a day. 12/19/16  Yes Jenny Lion CNP   minoxidil (LONITEN) 2.5 MG tablet Take 2.5 mg by mouth daily.   Yes PROVIDER, HISTORICAL   multivitamin (MULTIVITAMIN) per tablet Take 1 tablet by mouth daily.   Yes PROVIDER, HISTORICAL    NIASPAN EXTENDED-RELEASE 500 mg CR tablet Take 1 tablet by mouth  daily with food 12/29/15  Yes Cuco Bennett MD   psyllium (METAMUCIL) 0.52 gram capsule Take 0.52 g by mouth daily.   Yes PROVIDER, HISTORICAL   warfarin (COUMADIN) 5 MG tablet Take 7.5 mg M, W, F and 5 mg all other days of the week. 12/19/16  Yes Jenny Lion CNP       Allergies:   Allergies   Allergen Reactions     Iodine Hives     And convulsions from ivp dye     Lactose Diarrhea     Scallops Nausea And Vomiting       Immunizations:   Immunization History   Administered Date(s) Administered     Influenza, inj, historic 11/12/2007, 10/09/2008     Pneumo Conj 13-V (2010&after) 05/05/2016     Pneumo Polysac 23-V 04/20/2006     Td, historic 06/15/2006     Tdap 05/05/2016     ZOSTER 05/12/2016       Exam Chest clear to auscultation and percussion.  Heart tones regular rhythm without murmur rub or gallop.  Abdomen soft nontender no organomegaly.  No peritoneal signs.  Extremities free of edema cyanosis or clubbing.  Neck veins nondistended no thyromegaly or scleral icterus noted, carotids full.  Skin warm and dry easily conversant good spirited.  Normal intelligence.  Neurologically intact no gross localizing findings.  Tender left rib margin mid and anterior axillary line left side areas.  X-ray of chest and left ribs pending    Assessment and Plan   Chest pain likely rib fracture after trauma and fall earlier today.  No loss of consciousness no definite head trauma.  X-ray of chest with left rib detail pending.    End stage renal disease on chronic hemodialysis.    Hypertension and atrial fibrillation and diabetes mellitus type 2.    Cardioversion anticipated within the next 1-2 weeks once INR is in the therapeutic range for a sufficient period of time to prevent embolization of any potential intracardiac clots.  Discussed with patient and his son.    Time: total time spent with the patient was 25 minutes of which >50% was spent in counseling  and coordination of care    Patient will need preoperative examination 1-2 days prior to cardioversion.    Cuco Bennett MD    Patient Active Problem List   Diagnosis     Aneurysm Of The Abdominal Aorta     Adenocarcinoma Of The Prostate Gland     Type 2 diabetes mellitus with kidney complication     Chronic Renal Failure With Uremic Nephropathy     Mixed hyperlipidemia     Essential Hypertension     Vascular dementia     Depression     Lactose intolerance     Abdominal pain     Atrial flutter     Atrial fibrillation [I48.91]

## 2021-06-08 NOTE — PROGRESS NOTES
Pt had angio last week and was given vit K. Will restart weeks for CV. INR today 2.5 and will continue on current dose of 7.5 Sun and thur and 5 mg 5/7 days. Retest in one week. Spoke with son about dosing and plan. After talking with pt and discussing history of greens/salads and medication change. Pt will  continue  with current diet and dosing of Warfarin.  Continue with moderation of Vit K and green leafy vegetables. Cautioned to call with increase bruising or bleeding. Reminded to call with medication change especially antibiotic. Call with any questions or concerns or any up coming procedures. Cautioned about using Herbal medication.

## 2021-06-09 ENCOUNTER — RECORDS - HEALTHEAST (OUTPATIENT)
Dept: ADMINISTRATIVE | Facility: CLINIC | Age: 81
End: 2021-06-09

## 2021-06-09 ENCOUNTER — RECORDS - HEALTHEAST (OUTPATIENT)
Dept: CARDIOLOGY | Facility: CLINIC | Age: 81
End: 2021-06-09

## 2021-06-09 DIAGNOSIS — I48.92 UNSPECIFIED ATRIAL FLUTTER (H): ICD-10-CM

## 2021-06-09 NOTE — PROGRESS NOTES
Office Visit - Follow up    Manuel Marrero   76 y.o. male    Date of Visit: 3/10/2017    Chief Complaint   Patient presents with     Follow-up     cardioversion     Headache       Subjective: Diabetes mellitus type 2.    Status post cardioversion for atrial fibrillation.  Occasional headaches hypertension.  Seen by Dr. Lion on Wednesday EKG was good sinus mechanism.    Diabetic foot and eye examination all clear.    Colonoscopy showed no cancer on October 31, 2013 colorectal surgery group Dr. Mahoney presiding.    Patient denies chest pain or shortness of breath weight is down 5 pounds to 152.    No blood in stool or urine medication list is reviewed he is on hemodialysis and is an uric.  Accompanied by his son.  Hemodialysis sessions are well-tolerated 3 times per week.    ROS: A comprehensive review of systems was performed and was otherwise negative    Medications:  Prior to Admission medications    Medication Sig Start Date End Date Taking? Authorizing Provider   aspirin 81 MG tablet Take 81 mg by mouth daily.   Yes PROVIDER, HISTORICAL   atorvastatin (LIPITOR) 80 MG tablet Take 1 tablet (80 mg total) by mouth bedtime. 2/17/17  Yes Radha Choi MD   cinacalcet (SENSIPAR) 30 MG tablet Take 30 mg by mouth every evening.   Yes PROVIDER, HISTORICAL   cloNIDine HCl (CATAPRES) 0.1 MG tablet Take 1 tablet by mouth  twice a day 12/12/16  Yes Cuco Bennett MD   furosemide (LASIX) 80 MG tablet Take 80 mg by mouth 2 (two) times a day at 9am and 6pm.   Yes PROVIDER, HISTORICAL   isosorbide mononitrate (IMDUR) 30 MG 24 hr tablet Take 1 tablet (30 mg total) by mouth daily. 2/17/17  Yes Cuco Bennett MD   metoprolol tartrate (LOPRESSOR) 100 MG tablet Take 1 tablet (100 mg total) by mouth 2 (two) times a day. 2/9/17  Yes Afia Dwyer, CNP   minoxidil (LONITEN) 2.5 MG tablet Take 2.5 mg by mouth daily.   Yes PROVIDER, HISTORICAL   multivitamin (MULTIVITAMIN) per tablet Take 1 tablet by mouth  daily.   Yes PROVIDER, HISTORICAL   NIASPAN EXTENDED-RELEASE 500 mg CR tablet Take 1 tablet by mouth  daily with food 2/22/17  Yes Cuco Bennett MD   psyllium (METAMUCIL) 0.52 gram capsule Take 0.52 g by mouth daily.   Yes PROVIDER, HISTORICAL   warfarin (COUMADIN) 5 MG tablet Take 5 mg Sun, Tue, Thurs and 7.5 mg all other days. 2/17/17  Yes Radha Choi MD   warfarin (COUMADIN) 5 MG tablet Take 1 tablet by mouth on  Sunday, Tuesday and  Thursday, and 1 and 1/2  tablets by mouth all the  other days 2/22/17  Yes Radha Choi MD       Allergies:   Allergies   Allergen Reactions     Contrast [Iohexol]      Contrast Dye     Iodine Hives     And convulsions from ivp dye     Lactose Diarrhea     Scallops Nausea And Vomiting       Immunizations:   Immunization History   Administered Date(s) Administered     Influenza, inj, historic 11/12/2007, 10/09/2008     Pneumo Conj 13-V (2010&after) 05/05/2016     Pneumo Polysac 23-V 04/20/2006     Td, historic 06/15/2006     Tdap 05/05/2016     ZOSTER 05/12/2016       Exam Chest clear to auscultation and percussion.  Heart tones regular rhythm without murmur rub or gallop.  Abdomen soft nontender no organomegaly.  No peritoneal signs.  Extremities free of edema cyanosis or clubbing.  Neck veins nondistended no thyromegaly or scleral icterus noted, carotids full.  Skin warm and dry easily conversant good spirited.  Normal intelligence.  Neurologically intact no gross localizing findings.  Frail looking using a wheeled walker.  Shunts and arms for hemodialysis access.    Assessment and Plan  Diabetes mellitus type 2 check A1c plus lipid panel blood sugar today.  Patient is an uric and has end-stage renal disease on hemodialysis no urine for microalbumin is necessary.    Multiple drug allergies including iodine lactose scallops.    Diverticulosis see colonoscopy report of October 31, 2013.    Hypertension with headaches controlled blood pressure today at  128/60.    Atrial fibrillation status post successful cardioversion recently seen by Dr. JERRY Lion on Wednesday last EKG all good sinus mechanism.  A 1 month's time.    Time: total time spent with the patient was 25 minutes of which >50% was spent in counseling and coordination of care        Cuco Bennett MD    Patient Active Problem List   Diagnosis     Aneurysm Of The Abdominal Aorta     Type 2 diabetes mellitus with kidney complication     Chronic Renal Failure With Uremic Nephropathy     Vascular dementia     Depression     Lactose intolerance     Unstable angina     Peripheral artery disease     Typical atrial flutter     Essential hypertension     Hyperlipidemia LDL goal <70     Anemia     Allergy to iodine compound     Atherosclerosis of aorta     ESRD (end stage renal disease) on dialysis     Coronary artery disease due to calcified coronary lesion

## 2021-06-09 NOTE — PROGRESS NOTES
INR at 3.4 will decrease Warfarin to 7.5 mg M,W,F and 5 mg all other days. Retest in 2 weeks. Increase greens. After talking with pt and discussing history of greens/salads and medication change. Pt will  continue  with current diet and dosing of Warfarin.  Continue with moderation of Vit K and green leafy vegetables. Cautioned to call with increase bruising or bleeding. Reminded to call with medication change especially antibiotic. Call with any questions or concerns or any up coming procedures. Cautioned about using Herbal medication.    
no

## 2021-06-09 NOTE — PROGRESS NOTES
[facility]    Assessment/Plan:      Problem List Items Addressed This Visit     Typical atrial flutter - Primary (Chronic)    Relevant Orders    ECG 12 lead with MUSE (Completed)    Essential hypertension (Chronic)    Coronary artery disease due to calcified coronary lesion (Chronic)        1.  Persistent atrial flutter: No evidence of recurrence since cardioversion on 2/9/17.  He has had symptomatic improvement with less weakness and shortness of breath since cardioversion.  It remains unclear if these symptoms are due to medical management of coronary artery disease or maintenance of sinus rhythm.  Long-term, rhythm management is complicated by ESRD-HD and significant IV iodine allergy that may eliminate all antiarrhythmic medication options.  If he has symptomatic recurrence of typical atrial flutter, he may be a candidate for ablation.  Recommend watchful waiting to see if he has symptomatic recurrence.  If recurrence is asymptomatic, recommend rate control strategy.    He was again reassured that atrial flutter is not life-threatening, but does carry increased risk for stroke.  He has a [id number] score of 7 for age >75, vascular disease, prior CVA, diabetes, and hypertension; continue warfarin for stroke prophylaxis.    2.  Hypertension: Blood pressure at target today.      3.  Coronary artery disease: No symptoms of angina or shortness of breath.    Follow-up with Dr. Choi on 4/14/17 as previously scheduled.  Follow-up in 4 months.    Subjective:      Manuel Marrero, a very pleasant 76-year-old gentleman, comes in today accompanied by his son for follow-up of atrial flutter.  He was newly diagnosed with atrial flutter with rapid ventricular response on 10/18/16 with symptoms of palpitations.  Metoprolol was increased for rate control.  He also has a history of end-stage renal disease for which he's been on hemodialysis for about 8 years, cerebral aneurysm with reported CVA 40 years ago,  borderline diabetes, and hypertension.  Manuel previously reported occasional symptoms of a fast heart rate associated with fatigue and dyspnea on exertion, though symptoms apparently resolved with rate control.  He was having worsening shortness of breath and chest discomfort.  Coronary angiogram showed diffuse heavy aortic calcification and severe two-vessel coronary artery disease in the circumflex and RCA for which medical therapy was recommended unless he has recurrent symptoms.  He underwent cardioversion on 2/9/17.  He is on hemodialysis Monday, Wednesday, and Friday from about 11-3 pm for a three-hour run, so takes his medications at 5 AM and 5 PM.    Manuel reports that he has been feeling better since his cardioversion with less weakness and shortness of breath.  He denies chest discomfort, shortness of breath, paroxysmal nocturnal dyspnea, orthopnea, lightheadedness, dizziness, pre-syncope, or syncope.  He is on both warfarin and aspirin and continues to have occasional bleeding from his dialysis site.    Medical, surgical, family, social history, and medications were reviewed and updated as necessary.    Patient Active Problem List   Diagnosis     Aneurysm Of The Abdominal Aorta     Type 2 diabetes mellitus with kidney complication     Chronic Renal Failure With Uremic Nephropathy     Vascular dementia     Depression     Lactose intolerance     Unstable angina     Peripheral artery disease     Typical atrial flutter     Essential hypertension     Hyperlipidemia LDL goal <70     Anemia     Allergy to iodine compound     Atherosclerosis of aorta     ESRD (end stage renal disease) on dialysis     Coronary artery disease due to calcified coronary lesion       Past Medical History:   Diagnosis Date     Anemia      Aneurysm of abdominal aorta      Coronary artery disease due to calcified coronary lesion 1/12/2017    Severe RCA and LCX disease; see report of 12 Jan 17 by Dr. Julian Tom       Diabetes mellitus      Dyslipidemia, goal LDL below 70      End-stage renal disease on hemodialysis     hemodialysis 3 times a week     Essential hypertension      History of stroke 1976     Peripheral artery disease     axillary and subclavian disease     Prostate cancer 2004     Typical atrial flutter 10/18/2016     Vascular dementia        Past Surgical History:   Procedure Laterality Date     CARDIAC CATHETERIZATION N/A 1/12/2017    Procedure: Coronary Angiogram;  Surgeon: Sheri Jordan MD;  Location: [facility] Cath Lab;  Service:      CARDIOVERSION  02/09/2017     LAPAROSCOPIC CHOLECYSTECTOMY N/A 2/11/2016    Procedure:   LAPAROSCOPIC CHOLECYSTECTOMY ;  Surgeon: Last Guevara MD;  Location: [facility] OR;  Service:      TX CATH PLMT L HRT & ARTS W/NJX & ANGIO IMG S&I Left 1/12/2017    Procedure: Left Heart Catheterization Without Left Ventriculogram;  Surgeon: Sheri Jordan MD;  Location: [facility] Cath Lab;  Service: Cardiology     PROSTATECTOMY  2004    prostate cancer       Allergies   Allergen Reactions     Contrast [Iohexol]      Contrast Dye     Iodine Hives     And convulsions from ivp dye     Lactose Diarrhea     Scallops Nausea And Vomiting       Current Outpatient Prescriptions   Medication Sig Dispense Refill     aspirin 81 MG tablet Take 81 mg by mouth daily.       atorvastatin (LIPITOR) 80 MG tablet Take 1 tablet (80 mg total) by mouth bedtime. 90 tablet 2     cinacalcet (SENSIPAR) 30 MG tablet Take 30 mg by mouth every evening.       cloNIDine HCl (CATAPRES) 0.1 MG tablet Take 1 tablet by mouth  twice a day 180 tablet 5     furosemide (LASIX) 80 MG tablet Take 80 mg by mouth 2 (two) times a day at 9am and 6pm.       isosorbide mononitrate (IMDUR) 30 MG 24 hr tablet Take 1 tablet (30 mg total) by mouth daily. 90 tablet 3     metoprolol tartrate (LOPRESSOR) 100 MG tablet Take 1 tablet (100 mg total) by mouth 2 (two) times a day. 360 tablet 1     minoxidil (LONITEN) 2.5 MG tablet  "Take 2.5 mg by mouth daily.       multivitamin (MULTIVITAMIN) per tablet Take 1 tablet by mouth daily.       NIASPAN EXTENDED-RELEASE 500 mg CR tablet Take 1 tablet by mouth  daily with food 90 tablet 5     psyllium (METAMUCIL) 0.52 gram capsule Take 0.52 g by mouth daily.       warfarin (COUMADIN) 5 MG tablet Take 5 mg Sun, Tue, Thurs and 7.5 mg all other days. 135 tablet 0     warfarin (COUMADIN) 5 MG tablet Take 1 tablet by mouth on  Sunday, Tuesday and  Thursday, and 1 and 1/2  tablets by mouth all the  other days 135 tablet 0     No current facility-administered medications for this visit.        Family History   Problem Relation Age of Onset     Stroke Mother 62     cerebral hemorrhage     Cancer Father      No Medical Problems Son      No Medical Problems Son        Social History   Substance Use Topics     Smoking status: Former Smoker     Types: Cigarettes     Start date: 1970     Quit date: 5/17/2005     Smokeless tobacco: Never Used     Alcohol use No       Review of Systems:   General: WNL  Eyes: WNL  Ears/Nose/Throat: Nosebleeds  Lungs: WNL  Heart: Shortness of Breath with activity  Stomach: WNL  Bladder: WNL  Muscle/Joints: WNL  Skin: WNL  Nervous System: WNL  Mental Health: WNL     Blood: Easy Bleeding, Easy Bruising      Objective:      Visit Vitals     /70 (Patient Site: Left Arm, Patient Position: Sitting, Cuff Size: Adult Regular)     Pulse 64     Resp 18     Ht 5' 7\" (1.702 m)     Wt 157 lb (71.2 kg)     BMI 24.59 kg/m2       Physical Exam  General Appearance:  Alert, well-appearing, in no acute distress.     HEENT:  Atraumatic, normocephalic; no scleral icterus, EOM intact; the mucous membranes were pink and moist.   Chest: Chest symmetric, spine straight.     Lungs:   Respirations unlabored; the lungs are clear to auscultation.   Cardiovascular:   Auscultation reveals normal first and second heart sounds with no murmurs, rubs, or gallops.  Regular rhythm.  Radial and posterior tibial " pulses are intact.    Abdomen:  Soft, nontender, nondistended, bowel sounds present.     Extremities:  trace bilateral ankle edema.   Musculoskeletal:   moves all extremities.     Skin: No xanthelasma.   Neurologic: Mood and affect are appropriate. Oriented to person, place, time, and situation.       Cardiographics  ECG done 3/8/17 was personally reviewed, shows sinus rhythm at 62 bpm, QT/QTc interval measures 446/452 ms, consistent with manual measurement.  ECG done 11/29/16 was personally reviewed, shows atrial flutter with mildly elevated ventricular response at 103 bpm.  ECG: 10/18/16 was personally reviewed, shows atrial flutter with rapid ventricular response at 123 bpm.    Holter monitor worn on 11/1/16 shows persistent atrial flutter with predominantly 2:1 AV conduction and rapid ventricular response of approximately 120 bpm.    Echocardiogram: Done 11/3/16  Normal left ventricular size.  Mild concentric left ventricular hypertrophy.  No regional wall motion abnormalities.  Left ventricular ejection fraction is visually estimated at 50-55%.  Normal right ventricular size with preserved systolic function.  No significant valvular disease.      When compared to previous study on 5-, there is no significant  Difference.    Cardiac Cath done 1/12/17    Diffuse heavy calcification of his coronary arteries, aorta, iliac, and common femoral arteries. Aortic and iliac stents are noted.    Iliac arteries are tortuous and stiff. A long radial sheath was used.    Severe mid circumflex and proximal right coronary disease. Mild-moderate LAD disease and patent left main.    Prox RCA lesion 80% stenosed.    Bilateral carotid ultrasound 11/3/16  1. Extensive carotid atherosclerosis.  2. Bilateral internal carotid 50-69% diameters stenosis.  3. Left external carotid significant stenosis.  4. Antegrade vertebral flow bilaterally.    Lab Review   Lab Results   Component Value Date    CREATININE 9.45 (HH) 01/11/2017     BUN 42 (H) 01/11/2017     01/11/2017    K 5.3 (H) 02/09/2017     01/11/2017    CO2 23 01/11/2017     Lab Results   Component Value Date    TSH 2.01 10/18/2016     Lab Results   Component Value Date    WBC 8.1 01/10/2017    HGB 9.4 (L) 02/02/2017    HCT 29.0 (L) 01/10/2017    MCV 90 01/10/2017     01/10/2017     Lab Results   Component Value Date    INR 2.6 (!) 02/24/2017    INR 3.10 (!) 02/09/2017    INR 3.1 02/06/2017             Jenny Lion, [facility], Electrophysiology  [phone]    This note has been dictated using voice recognition software. Any grammatical, typographical, or context distortions are unintentional and inherent to the software.

## 2021-06-10 NOTE — PROGRESS NOTES
Office Visit - Follow up    Manuel Marrero   76 y.o. male    Date of Visit: 4/11/2017    Chief Complaint   Patient presents with     Chronic Kidney Disease     Diabetes     Hypertension     blood pressure has been elevated Losartan 25mg started 4/3/17       Subjective: Hypertension.    Blood pressure elevated.  Losartan 25 mg a day started on April 3, 2017.    Patient has had a history of atrial fibrillation plus diabetes mellitus type 2 and hypertension.  He has been on hemodialysis for the last 8 years 3 times a week tolerates it well except for blood pressure has been elevated.  He is followed by Dr. Medeiros from nephrology in this regard.    Minoxidil dose is noted to be 2.5 mg on dialysis days.    No blood in stool or urine no chest pain or shortness of breath accompanied by his son Manuel who is very supportive.    Medication list reviewed generally well-tolerated.    The patient also describes black stools hemoglobin level is pending there is no unusual order to the bowel movement however he is not on any medications other than warfarin that would cause him to bleed except for aspirin low-dose 81 mg daily.    No ashlee blood in the stool or urine.  Medication list reviewed generally well-tolerated he is on 2 medications that could cause intestinal bleeding and they are warfarin and aspirin.    ROS: A comprehensive review of systems was performed and was otherwise negative    Medications:  Prior to Admission medications    Medication Sig Start Date End Date Taking? Authorizing Provider   aspirin 81 MG tablet Take 81 mg by mouth daily.   Yes PROVIDER, HISTORICAL   atorvastatin (LIPITOR) 80 MG tablet Take 1 tablet (80 mg total) by mouth bedtime. 2/17/17  Yes Radha Choi MD   cinacalcet (SENSIPAR) 30 MG tablet Take 30 mg by mouth every evening.   Yes PROVIDER, HISTORICAL   cloNIDine HCl (CATAPRES) 0.1 MG tablet Take 1 tablet by mouth  twice a day 12/12/16  Yes Cuco Bennett MD   furosemide  (LASIX) 80 MG tablet Take 80 mg by mouth 2 (two) times a day at 9am and 6pm.   Yes PROVIDER, HISTORICAL   isosorbide mononitrate (IMDUR) 30 MG 24 hr tablet Take 1 tablet (30 mg total) by mouth daily. 2/17/17  Yes Cuco Bennett MD   losartan (COZAAR) 25 MG tablet Take 25 mg by mouth daily.   Yes PROVIDER, HISTORICAL   metoprolol tartrate (LOPRESSOR) 100 MG tablet Take 1 tablet (100 mg total) by mouth 2 (two) times a day. 2/9/17  Yes Afia Dwyer CNP   minoxidil (LONITEN) 2.5 MG tablet Take 1 tablet (2.5 mg total) by mouth daily. 4/11/17  Yes Cuco Bennett MD   multivitamin (MULTIVITAMIN) per tablet Take 1 tablet by mouth daily.   Yes PROVIDER, HISTORICAL   NIASPAN EXTENDED-RELEASE 500 mg CR tablet Take 1 tablet by mouth  daily with food 2/22/17  Yes Cuco Bennett MD   psyllium (METAMUCIL) 0.52 gram capsule Take 0.52 g by mouth daily.   Yes PROVIDER, HISTORICAL   warfarin (COUMADIN) 5 MG tablet Take 5 mg Sun, Tue, Thurs and 7.5 mg all other days. 2/17/17  Yes Radha Choi MD   warfarin (COUMADIN) 5 MG tablet Take 1 tablet by mouth on  Sunday, Tuesday and  Thursday, and 1 and 1/2  tablets by mouth all the  other days 2/22/17  Yes Radha Choi MD   minoxidil (LONITEN) 2.5 MG tablet Take 2.5 mg by mouth daily.  4/11/17 Yes PROVIDER, HISTORICAL       Allergies:   Allergies   Allergen Reactions     Contrast [Iohexol]      Contrast Dye     Iodine Hives     And convulsions from ivp dye     Lactose Diarrhea     Scallops Nausea And Vomiting       Immunizations:   Immunization History   Administered Date(s) Administered     Influenza, inj, historic 11/12/2007, 10/09/2008     Pneumo Conj 13-V (2010&after) 05/05/2016     Pneumo Polysac 23-V 04/20/2006     Td, historic 06/15/2006     Tdap 05/05/2016     ZOSTER 05/12/2016       Exam Chest clear to auscultation and percussion.  Heart tones regular rhythm without murmur rub or gallop.  Abdomen soft nontender no organomegaly.  No  peritoneal signs.  Extremities free of edema cyanosis or clubbing.  Neck veins nondistended no thyromegaly or scleral icterus noted, carotids full.  Skin warm and dry easily conversant good spirited.  Normal intelligence.  Neurologically intact no gross localizing findings.  He is happy and content that he did do dialysis realizing that if he did not do chronic hemodialysis he would have had a premature death 8 years prior.    Assessment and Plan  Melena check hemoglobin today.    End-stage renal disease on hemodialysis 3 times a week.    Multiple drug allergies including contrast media iodine lactose and scallops with associated nausea and vomiting.    Labile hypertension.  176/76.  Increase minoxidil to 5 mg daily recheck patient 1 month's time.    Advised patient that we would refill the minoxidil but instead of taking 2.5 mg daily to increase at the 5 mg daily.    Hyper lipidemia.    Diabetes mellitus type 2.    History of atrial flutter fibrillation on chronic warfarin therapy.  INR check may be done today.  Return to clinic 1 month's time.    Time: total time spent with the patient was 25 minutes of which >50% was spent in counseling and coordination of care    The following high BMI interventions were performed this visit: encouragement to exercise    Cuco Bennett MD    Patient Active Problem List   Diagnosis     Aneurysm Of The Abdominal Aorta     Type 2 diabetes mellitus with kidney complication     Chronic Renal Failure With Uremic Nephropathy     Vascular dementia     Depression     Lactose intolerance     Unstable angina     Peripheral artery disease     Typical atrial flutter     Essential hypertension     Hyperlipidemia LDL goal <70     Anemia     Allergy to iodine compound     Atherosclerosis of aorta     ESRD (end stage renal disease) on dialysis     Coronary artery disease due to calcified coronary lesion

## 2021-06-10 NOTE — PROGRESS NOTES
INR 1.8 took partial dose last night due to bleeding from dialysis site yesterday. Will continue at current dose and retest in 2 weeks. After talking with pt and discussing history of greens/salads and medication change. Pt will  continue  with current diet and dosing of Warfarin.  Continue with moderation of Vit K and green leafy vegetables. Cautioned to call with increase bruising or bleeding. Reminded to call with medication change especially antibiotic. Call with any questions or concerns or any up coming procedures. Cautioned about using Herbal medication.

## 2021-06-10 NOTE — PROGRESS NOTES
Office Visit - Follow up    Manuel Marrero   77 y.o. male    Date of Visit: 5/23/2017    Chief Complaint   Patient presents with     Hypertension     Minoxidil increased to 5mg daily     Diabetes     Chronic Kidney Disease       Subjective: Diabetes mellitus type 2 with chronic kidney disease end-stage renal disease on hemodialysis 3 times a week for number of years plus hypertension.  Blood pressure readings have been high around noon minoxidil dose has been increased to 5 mg daily the patient is not noticing any increased hair growth or hirsutism although he has a fairly full head of hair with minimal temporal baldness and a goatee or beard growing as well.  Hemoccults received all negative April 28, 2017.    No blood in stool or urine no chest pain or shortness of breath no palpitations.  Offered lab testing today patient declined med list reviewed generally well-tolerated.    ROS: A comprehensive review of systems was performed and was otherwise negative    Medications:  Prior to Admission medications    Medication Sig Start Date End Date Taking? Authorizing Provider   aspirin 81 MG tablet Take 81 mg by mouth daily.   Yes PROVIDER, HISTORICAL   atorvastatin (LIPITOR) 80 MG tablet Take 1 tablet (80 mg total) by mouth bedtime. 2/17/17  Yes Radha Choi MD   calcium acetate (PHOSLO) 667 mg capsule Take 1,334 mg by mouth 3 (three) times a day with meals.   Yes PROVIDER, HISTORICAL   cinacalcet (SENSIPAR) 30 MG tablet Take 30 mg by mouth every evening.   Yes PROVIDER, HISTORICAL   cloNIDine HCl (CATAPRES) 0.1 MG tablet Take 1 tablet by mouth  twice a day 12/12/16  Yes Cuco Bennett MD   furosemide (LASIX) 80 MG tablet Take 80 mg by mouth 2 (two) times a day at 9am and 6pm.   Yes PROVIDER, HISTORICAL   isosorbide mononitrate (IMDUR) 30 MG 24 hr tablet Take 1 tablet (30 mg total) by mouth daily. 2/17/17  Yes Cuco Bennett MD   losartan (COZAAR) 25 MG tablet Take 25 mg by mouth daily.   Yes  PROVIDER, HISTORICAL   metoprolol tartrate (LOPRESSOR) 100 MG tablet Take 1 tablet (100 mg total) by mouth 2 (two) times a day. 2/9/17  Yes Afia Dwyer CNP   minoxidil (LONITEN) 2.5 MG tablet Take 1 tablet (2.5 mg total) by mouth daily.  Patient taking differently: Take 2.5 mg by mouth 2 (two) times a day.  4/11/17  Yes Cuco Bennett MD   multivitamin (MULTIVITAMIN) per tablet Take 1 tablet by mouth daily.   Yes PROVIDER, HISTORICAL   NIASPAN EXTENDED-RELEASE 500 mg CR tablet Take 1 tablet by mouth  daily with food 2/22/17  Yes Cuco Bennett MD   psyllium (METAMUCIL) 0.52 gram capsule Take 0.52 g by mouth daily.   Yes PROVIDER, HISTORICAL   warfarin (COUMADIN) 5 MG tablet Take 5 mg Sun, Tue, Thurs and 7.5 mg all other days. 2/17/17  Yes Radha Choi MD   warfarin (COUMADIN) 5 MG tablet Take 1 tablet by mouth on  Sunday, Tuesday and  Thursday, and 1 and 1/2  tablets by mouth all the  other days 2/22/17  Yes Radha Cohi MD       Allergies:   Allergies   Allergen Reactions     Contrast [Iohexol]      Contrast Dye     Iodine Hives     And convulsions from ivp dye     Lactose Diarrhea     Scallops Nausea And Vomiting       Immunizations:   Immunization History   Administered Date(s) Administered     Influenza, inj, historic 11/12/2007, 10/09/2008     Pneumo Conj 13-V (2010&after) 05/05/2016     Pneumo Polysac 23-V 04/20/2006     Td, historic 06/15/2006     Tdap 05/05/2016     ZOSTER 05/12/2016       Exam Chest clear to auscultation and percussion.  Heart tones regular rhythm without murmur rub or gallop.  Abdomen soft nontender no organomegaly.  No peritoneal signs.  Extremities free of edema cyanosis or clubbing.  Neck veins nondistended no thyromegaly or scleral icterus noted, carotids full.  Skin warm and dry easily conversant good spirited.  Normal intelligence.  Neurologically intact no gross localizing findings.  Difficult to hear case sounds of blood pressure left arm  sitting.  Generally felt to be 132/78 today.  Pulse 80 regular respirations 18 and unlabored.  Wears glasses easily conversant accompanied by his son who is very supportive Armando Marrero Junior.    Assessment and Plan  End-stage renal disease on hemodialysis\chronic kidney disease and uric.    Hypertension control.    Diabetes mellitus type 2 offered lab testing patient declined.    Multiple drug allergies including iodine lactose and scallops nausea or vomiting.  Chronic warfarin therapy and type 2 diabetes mellitus with next office visit fasting we will do A1c lipid panel and blood sugar spot check.    Time: total time spent with the patient was 25 minutes of which >50% was spent in counseling and coordination of care        Cuco Bennett MD    Patient Active Problem List   Diagnosis     Aneurysm Of The Abdominal Aorta     Type 2 diabetes mellitus with kidney complication     Chronic Renal Failure With Uremic Nephropathy     Vascular dementia     Depression     Lactose intolerance     Unstable angina     Peripheral artery disease     Typical atrial flutter     Essential hypertension     Hyperlipidemia LDL goal <70     Anemia     Allergy to iodine compound     Atherosclerosis of aorta     ESRD (end stage renal disease) on dialysis     Coronary artery disease due to calcified coronary lesion

## 2021-06-11 NOTE — PROGRESS NOTES
[facility]    Assessment/Plan:      Problem List Items Addressed This Visit     Typical atrial flutter - Primary (Chronic)    Essential hypertension (Chronic)    Coronary artery disease due to calcified coronary lesion (Chronic)        1.  Persistent atrial flutter: No symptomatology or evidence of recurrence since cardioversion on 2/9/17.  Symptoms consist of fatigue and dyspnea on exertion, though were mild once ventricular rates were controlled.  Long-term, rhythm management is complicated by ESRD-HD.  He has an iodine allergy, but not anaphylaxis, so would likely tolerate amiodarone.  If he has symptomatic recurrence of typical atrial flutter, he may be a candidate for ablation versus amiodarone.  Recommend watchful waiting to see if he has symptomatic recurrence.  If recurrence is asymptomatic, recommend rate control strategy.    He was reassured that atrial flutter is not life-threatening, but does carry increased risk for stroke.  He has a [id number] score of 7 for age >75, vascular disease, prior CVA, diabetes, and hypertension; continue warfarin for stroke prophylaxis.    2.  Hypertension: Blood pressure somewhat elevated today, but has been well controlled outside of dialysis.  Management per Dr. Bennett.      3.  Coronary artery disease: Severe two-vessel CAD involving his RCA and circumflex under medical management.  No symptoms of angina or shortness of breath.  Continue aspirin.    Follow-up with Dr. Choi in 3 months.  Arrhythmia follow-up in 9 months.    Subjective:      Manuel Marrero, a very pleasant 77-year-old gentleman, comes in today accompanied by his son for follow-up of atrial flutter.  He was newly diagnosed with atrial flutter with rapid ventricular response on 10/18/16 with symptoms of palpitations.  Metoprolol was increased for rate control.  He also has a history of end-stage renal disease for which he's been on hemodialysis for about 8 years, cerebral aneurysm with  reported CVA 40 years ago, borderline diabetes, and hypertension.  Manuel previously reported occasional symptoms of a fast heart rate associated with fatigue and dyspnea on exertion, though symptoms apparently resolved with rate control.  He was having worsening shortness of breath and chest discomfort.  Coronary angiogram showed diffuse heavy aortic calcification and severe two-vessel coronary artery disease in the circumflex and RCA for which medical therapy was recommended unless he has recurrent symptoms.  He underwent cardioversion on 2/9/17 after which he reported an improvement in energy level and shortness of breath.  He is on hemodialysis Monday, Wednesday, and Friday from about 11-3 pm for a three-hour run, so takes his medications at 5 AM and 5 PM.  He is on both warfarin and aspirin and has occasional bleeding from his dialysis site.    Manuel states that he has been feeling fairly well.  He does not report any symptoms of arrhythmia, but has noted some intermittent nonexertional back pain for the past week that is relieved with low-dose aspirin.  His son reports that his blood pressures remain elevated during the final stages of dialysis, but otherwise have been okay.  He denies chest discomfort, significant shortness of breath, paroxysmal nocturnal dyspnea, orthopnea, lightheadedness, dizziness, pre-syncope, or syncope.      Medical, surgical, family, social history, and medications were reviewed and updated as necessary.    Patient Active Problem List   Diagnosis     Aneurysm Of The Abdominal Aorta     Type 2 diabetes mellitus with kidney complication     Chronic Renal Failure With Uremic Nephropathy     Vascular dementia     Depression     Lactose intolerance     Unstable angina     Peripheral artery disease     Typical atrial flutter     Essential hypertension     Hyperlipidemia LDL goal <70     Anemia     Allergy to iodine compound     Atherosclerosis of aorta     ESRD (end stage renal disease) on  dialysis     Coronary artery disease due to calcified coronary lesion       Past Medical History:   Diagnosis Date     Anemia      Aneurysm of abdominal aorta      Coronary artery disease due to calcified coronary lesion 1/12/2017    Severe RCA and LCX disease; see report of 12 Jan 17 by Dr. Jordan     Depression      Diabetes mellitus      Dyslipidemia, goal LDL below 70      End-stage renal disease on hemodialysis     hemodialysis 3 times a week     Essential hypertension      History of stroke 1976     Peripheral artery disease     axillary and subclavian disease     Prostate cancer 2004     Typical atrial flutter 10/18/2016     Vascular dementia        Past Surgical History:   Procedure Laterality Date     CARDIAC CATHETERIZATION N/A 1/12/2017    Procedure: Coronary Angiogram;  Surgeon: Shrei Jordan MD;  Location: [facility] Cath Lab;  Service:      CARDIOVERSION  02/09/2017     LAPAROSCOPIC CHOLECYSTECTOMY N/A 2/11/2016    Procedure:   LAPAROSCOPIC CHOLECYSTECTOMY ;  Surgeon: Last Guevara MD;  Location: [facility] OR;  Service:      RI CATH PLMT L HRT & ARTS W/NJX & ANGIO IMG S&I Left 1/12/2017    Procedure: Left Heart Catheterization Without Left Ventriculogram;  Surgeon: Sheri Jordan MD;  Location: [facility] Cath Lab;  Service: Cardiology     PROSTATECTOMY  2004    prostate cancer       Allergies   Allergen Reactions     Contrast [Iohexol]      Contrast Dye     Iodinated Contrast- Oral And Iv Dye Hives     And shortness of breath.   This is the contrast dye used in angiograms     Iodine Hives     And convulsions from ivp dye     Lactose Diarrhea     Scallops Nausea And Vomiting       Current Outpatient Prescriptions   Medication Sig Dispense Refill     aspirin 81 MG tablet Take 81 mg by mouth daily.       atorvastatin (LIPITOR) 80 MG tablet Take 1 tablet (80 mg total) by mouth bedtime. 90 tablet 2     calcium acetate (PHOSLO) 667 mg capsule Take 1,334 mg by mouth 3 (three) times a day  "with meals.       cinacalcet (SENSIPAR) 30 MG tablet Take 30 mg by mouth every evening.       cloNIDine HCl (CATAPRES) 0.1 MG tablet Take 1 tablet by mouth  twice a day 180 tablet 5     furosemide (LASIX) 80 MG tablet Take 80 mg by mouth 2 (two) times a day at 9am and 6pm.       isosorbide mononitrate (IMDUR) 30 MG 24 hr tablet Take 1 tablet (30 mg total) by mouth daily. 90 tablet 3     losartan (COZAAR) 100 MG tablet Take 1 tablet (100 mg total) by mouth daily. 90 tablet 3     metoprolol tartrate (LOPRESSOR) 100 MG tablet Take 2 tablets by mouth two times daily 360 tablet 3     minoxidil (LONITEN) 2.5 MG tablet Take 1 tablet (2.5 mg total) by mouth daily. (Patient taking differently: Take 2.5 mg by mouth 2 (two) times a day. ) 60 tablet 12     multivitamin (MULTIVITAMIN) per tablet Take 1 tablet by mouth daily.       NIASPAN EXTENDED-RELEASE 500 mg CR tablet Take 1 tablet by mouth  daily with food 90 tablet 5     psyllium (METAMUCIL) 0.52 gram capsule Take 0.52 g by mouth daily.       warfarin (COUMADIN) 5 MG tablet Take 5 mg Sun, Tue, Thurs and 7.5 mg all other days. 135 tablet 0     No current facility-administered medications for this visit.        Family History   Problem Relation Age of Onset     Stroke Mother 62     cerebral hemorrhage     Cancer Father      No Medical Problems Son      No Medical Problems Son        Social History   Substance Use Topics     Smoking status: Former Smoker     Types: Cigarettes     Start date: 1970     Quit date: 5/17/2005     Smokeless tobacco: Never Used     Alcohol use No       Review of Systems:   General: WNL  Eyes: WNL  Ears/Nose/Throat: WNL  Lungs: WNL  Heart: Chest Pain  Stomach: WNL  Bladder: WNL  Muscle/Joints: WNL  Skin: WNL  Nervous System: WNL  Mental Health: WNL     Blood: WNL      Objective:      /60 (Patient Site: Left Arm)  Pulse 64  Resp 18  Ht 5' 7\" (1.702 m)  Wt 157 lb (71.2 kg)  BMI 24.59 kg/m2    Physical Exam  General Appearance:  Alert, " well-appearing, in no acute distress.     HEENT:  Atraumatic, normocephalic; no scleral icterus, EOM intact; the mucous membranes were pink and moist.   Chest: Chest symmetric, spine straight.     Lungs:   Respirations unlabored; the lungs are clear to auscultation.   Cardiovascular:   Auscultation reveals normal first and second heart sounds with no murmurs, rubs, or gallops.  Regular rhythm.  Radial and posterior tibial pulses are intact.    Abdomen:  Soft, nontender, nondistended, bowel sounds present.     Extremities: Trace bilateral ankle edema.  RUE AV fistula.   Musculoskeletal:  Moves all extremities.     Skin: No xanthelasma.   Neurologic: Mood and affect are appropriate. Oriented to person, place, time, and situation.       Cardiographics  ECG done 5/31/17 was personally reviewed, shows sinus rhythm at 56 bpm, QT/QTc interval measures 446/430 ms, consistent with manual measurement.  ECG done 11/29/16 was personally reviewed, shows atrial flutter with mildly elevated ventricular response at 103 bpm.  ECG: 10/18/16 was personally reviewed, shows atrial flutter with rapid ventricular response at 123 bpm.    Holter monitor worn on 11/1/16 shows persistent atrial flutter with predominantly 2:1 AV conduction and rapid ventricular response of approximately 120 bpm.    Echocardiogram: Done 11/3/16  Normal left ventricular size.  Mild concentric left ventricular hypertrophy.  No regional wall motion abnormalities.  Left ventricular ejection fraction is visually estimated at 50-55%.  Normal right ventricular size with preserved systolic function.  No significant valvular disease.      When compared to previous study on 5-, there is no significant  Difference.    Cardiac Cath done 1/12/17    Diffuse heavy calcification of his coronary arteries, aorta, iliac, and common femoral arteries. Aortic and iliac stents are noted.    Iliac arteries are tortuous and stiff. A long radial sheath was used.    Severe mid  circumflex and proximal right coronary disease. Mild-moderate LAD disease and patent left main.    Prox RCA lesion 80% stenosed.    Bilateral carotid ultrasound 11/3/16  1. Extensive carotid atherosclerosis.  2. Bilateral internal carotid 50-69% diameters stenosis.  3. Left external carotid significant stenosis.  4. Antegrade vertebral flow bilaterally.    Lab Review   Lab Results   Component Value Date    CREATININE 9.45 (HH) 01/11/2017    BUN 42 (H) 01/11/2017     01/11/2017    K 5.3 (H) 02/09/2017     01/11/2017    CO2 23 01/11/2017     Lab Results   Component Value Date    TSH 2.01 10/18/2016     Lab Results   Component Value Date    WBC 8.1 01/10/2017    HGB 9.9 (L) 04/11/2017    HCT 29.0 (L) 01/10/2017    MCV 90 01/10/2017     01/10/2017     Lab Results   Component Value Date    INR 1.8 (!) 06/30/2017    INR 1.7 (!) 06/16/2017    INR 2.6 05/15/2017             Jenny Lion, [facility], Electrophysiology  [phone]    This note has been dictated using voice recognition software. Any grammatical, typographical, or context distortions are unintentional and inherent to the software.

## 2021-06-11 NOTE — PROGRESS NOTES
INR 2.8 INR stable. Discussed continuing management of dose of Warfarin and returning in one month . No changes to diet needed at this time. Continue moderate intake of Vitamin K and call if increase bruising or unexplained bleeding. Call with medication changes or upcoming procedures.

## 2021-06-11 NOTE — PROGRESS NOTES
INR 1.8 will increase Warfarin to 5 mg Sun, tue, thur and 7.5 mg all other days. INR stable. Discussed continuing management of dose of Warfarin and returning in one month . No changes to diet needed at this time. Continue moderate intake of Vitamin K and call if increase bruising or unexplained bleeding. Call with medication changes or upcoming procedures.

## 2021-06-11 NOTE — PROGRESS NOTES
INR low at 1.7. Will increase Sat dose to 7.5 mg and then return to current dosing. Retest in 2 weeks. After talking with pt and discussing history of greens/salads and medication change. Pt will  continue  with current diet and dosing of Warfarin.  Continue with moderation of Vit K and green leafy vegetables. Cautioned to call with increase bruising or bleeding. Reminded to call with medication change especially antibiotic. Call with any questions or concerns or any up coming procedures. Cautioned about using Herbal medication.

## 2021-06-12 NOTE — PROCEDURES
Interventional Radiology Post-Procedure Note   [facility]  ?   Brief Procedure Note:   Patient name: Manuel Marrero  Pt MRN:[id number]   Date of procedure: 8/22/2017     Procedure(s): Fistulogram with PTA of venous outflow  Sedation method: Moderate sedation was employed. The patient was monitored by an interventional radiology nurse at all times throughout the procedure under my direct guidance.  Pre Procedure Diagnosis: ESRD, RUE fistula  Post Procedure Diagnosis: same  Indications: RUE Fistula with prolonged bleeding   ?   Attending: Murtaza Miller M.D.  Specimen(s) removed: None   Additional studies ordered: None  Drains: None   Estimated Blood Loss: Minimal  Complications: None  Vascular closure method: Manual pressure   Findings/Notes/Comments: Multiple moderate stenoses in RUE fistula treated with high pressure 8mm balloon angioplasty without complication.   ?   Please see dictation in PACS or under the Imaging tab in [facility] for detailed procedure note.     Murtaza Miller M.D.   Vascular and Interventional Radiology   Pager: [phone]   After Hours / Scheduling: [phone]     8/22/2017  2:06 PM

## 2021-06-12 NOTE — H&P
[facility] Radiology History and Physical Note    Procedure Requested: RIGHT upper extremity fistulogram   Requesting Provider: Narda Medeiros MD    HPI: Manuel Marrero is a 77 y.o. old male hx of DM-II, atrial flutter, HD dependent ESRD via right upper arm transposed basilic fistula. Presents for RUE fistulogram for prolong bleeding.     Allergy to IV Contrast -Hives; patient took pre-treatment medications of oral steroids and benadryl per SPR protocol.     IMAGIN17  Last fistulogram   FINDINGS:  The fistulogram demonstrates a right upper arm transposed basilic fistula. There is a stenosis within the transposed segment measuring approximately 60% and an additional stenosis at the swing point measuring approximate 40%. The arterial anastomosis,   perianastomotic inflow and central veins are widely patent. No significant residual stenosis is present after angioplasty.     IMPRESSION:   Dialysis arteriovenous shunt evaluation showing significant transposed basilic vein outflow stenoses successfully treated with angioplasty.    NPO Status:   Anticoagulation/Antiplatelets/Bleeding tendencies:ASA and Coumadin( AFIb)-held for several days   Antibiotics: NONE for IR     REVIEW OF SYMPTOMS: as above otherwise remainder 14 point ROS negative     PAST MEDICAL HISTORY:   Past Medical History:   Diagnosis Date     Anemia      Aneurysm of abdominal aorta      Coronary artery disease due to calcified coronary lesion 2017    Severe RCA and LCX disease; see report of  by Dr. Jordan     Depression      Diabetes mellitus      Dyslipidemia, goal LDL below 70      End-stage renal disease on hemodialysis     hemodialysis 3 times a week     Essential hypertension      History of stroke      Peripheral artery disease     axillary and subclavian disease     Prostate cancer      Typical atrial flutter 10/18/2016     Vascular dementia        PAST SURGICAL HISTORY:  Past Surgical History:   Procedure  Laterality Date     CARDIAC CATHETERIZATION N/A 1/12/2017    Procedure: Coronary Angiogram;  Surgeon: Sheri Jordan MD;  Location: [facility] Cath Lab;  Service:      CARDIOVERSION  02/09/2017     LAPAROSCOPIC CHOLECYSTECTOMY N/A 2/11/2016    Procedure:   LAPAROSCOPIC CHOLECYSTECTOMY ;  Surgeon: Last Guevara MD;  Location: [facility] OR;  Service:      WA CATH PLMT L HRT & ARTS W/NJX & ANGIO IMG S&I Left 1/12/2017    Procedure: Left Heart Catheterization Without Left Ventriculogram;  Surgeon: Sheri Jordan MD;  Location: [facility] Cath Lab;  Service: Cardiology     PROSTATECTOMY  2004    prostate cancer       ALLERGIES:  Contrast [iohexol]; Iodinated contrast- oral and iv dye; Iodine; Lactose; and Scallops    MEDICATIONS:  Current Outpatient Prescriptions   Medication Sig Dispense Refill     aspirin 81 MG tablet Take 81 mg by mouth daily.       atorvastatin (LIPITOR) 80 MG tablet Take 1 tablet (80 mg total) by mouth bedtime. 90 tablet 2     calcium acetate (PHOSLO) 667 mg capsule Take 1,334 mg by mouth 3 (three) times a day with meals.       cinacalcet (SENSIPAR) 30 MG tablet Take 30 mg by mouth every evening.       cloNIDine HCl (CATAPRES) 0.1 MG tablet Take 1 tablet by mouth  twice a day 180 tablet 5     furosemide (LASIX) 80 MG tablet Take 80 mg by mouth 2 (two) times a day at 9am and 6pm.       isosorbide mononitrate (IMDUR) 30 MG 24 hr tablet Take 1 tablet (30 mg total) by mouth daily. 90 tablet 3     losartan (COZAAR) 100 MG tablet Take 1 tablet (100 mg total) by mouth daily. 90 tablet 3     metoprolol tartrate (LOPRESSOR) 100 MG tablet Take 2 tablets by mouth two times daily 360 tablet 3     minoxidil (LONITEN) 2.5 MG tablet Take 1 tablet (2.5 mg total) by mouth daily. (Patient taking differently: Take 2.5 mg by mouth 2 (two) times a day. ) 60 tablet 12     multivitamin (MULTIVITAMIN) per tablet Take 1 tablet by mouth daily.       NIASPAN EXTENDED-RELEASE 500 mg CR tablet Take 1 tablet by  mouth  daily with food 90 tablet 5     psyllium (METAMUCIL) 0.52 gram capsule Take 0.52 g by mouth daily.       warfarin (COUMADIN) 5 MG tablet Take 5 mg Sun, Tue, Thurs and 7.5 mg all other days. 135 tablet 0     No current facility-administered medications for this encounter.        LABS:  Lab Results   Component Value Date    K 4.7 08/22/2017     Lab Results   Component Value Date    INR 1.02 08/22/2017    INR 2.4 08/14/2017    INR 1.80 (H) 07/25/2017     Lab Results   Component Value Date    HGB 11.1 (L) 08/22/2017     Lab Results   Component Value Date     08/22/2017         EXAM:  /75  Pulse (!) 59  Temp 97.9  F (36.6  C) (Oral)   Resp 16  SpO2 97%    General: Stable. In no acute distress.  Neuro: A&O x 3. Resp: Lungs clear to auscultation bilaterally.  Cardio: S1S2 and reg, 2/6 murmur, clicks or rubs  Abdomen: Soft, non-distended, non-tender, positive bowel sounds.  RUE: upper arm AVF with positive bruit and thrill (site marked) non swollen, non erythematous      Pre-Sedation Assessment:  Mallampati Airway Classification: Class 2: upper half of tonsil fossa visible  Previous reaction to anesthesia/sedation: no  Sedation plan based on assessment: Moderate  Sleep Apnea: no  Dentures: no  COPD: no  ASA Classification: ASA 3 - Patient with moderate systemic disease with functional limitations  Comments: No hx of asthma.     ASSESSMENT:   Prolong bleeding     PLAN: RIGHT upper extremity fistulogram with possible intervention(s)    The procedure, risks and moderate sedation were discussed with the patient, all questions answered and patient agrees to proceed with the procedure. Written consent obtained.    Justine Landry,CNP  [facility]: Interventional Radiology   [phone]

## 2021-06-12 NOTE — PROGRESS NOTES
INR 1.8. Pt having Cataract surgery on 7/27, will dose increase on thur after surgery. Retest 8/14. After talking with pt and discussing history of greens/salads and medication change. Pt will  continue  with current diet and dosing of Warfarin.  Continue with moderation of Vit K and green leafy vegetables. Cautioned to call with increase bruising or bleeding. Reminded to call with medication change especially antibiotic. Call with any questions or concerns or any up coming procedures. Cautioned about using Herbal medication.  Spoke with son.

## 2021-06-12 NOTE — PROGRESS NOTES
Office Visit - Follow up    Manuel Marrero   77 y.o. male    Date of Visit: 9/7/2017    Chief Complaint   Patient presents with     Chronic Kidney Disease     Diabetes     Hypertension       Subjective: Diabetes mellitus type 2 with chronic kidney disease hypertension and hyperlipidemia.  Multiple drug allergies are noted including iodine plus lactulose and scallops.  Patient otherwise feels well he makes no urine he has been on hemodialysis for 9 years 3 times per week Monday Wednesday Friday.  Sometimes during blood pressure dialysis runs a blood pressure will spike real high since he started eating pickle beats the patient notes no spray spike in blood pressure elevation.  Patient denies chest pain or shortness of breath no blood in stool or urine no exertional syncope or palpitations.  Medication list reviewed generally well-tolerated above allergies noted and reviewed.    ROS: A comprehensive review of systems was performed and was otherwise negative    Medications:  Prior to Admission medications    Medication Sig Start Date End Date Taking? Authorizing Provider   aspirin 81 MG tablet Take 81 mg by mouth daily.   Yes PROVIDER, HISTORICAL   atorvastatin (LIPITOR) 80 MG tablet Take 1 tablet (80 mg total) by mouth bedtime. 2/17/17  Yes Radha Choi MD   calcium acetate (PHOSLO) 667 mg capsule Take 1,334 mg by mouth 3 (three) times a day with meals.   Yes PROVIDER, HISTORICAL   cinacalcet (SENSIPAR) 30 MG tablet Take 30 mg by mouth every evening.   Yes PROVIDER, HISTORICAL   cloNIDine HCl (CATAPRES) 0.1 MG tablet Take 1 tablet by mouth  twice a day 12/12/16  Yes Cuco Bennett MD   furosemide (LASIX) 80 MG tablet Take 80 mg by mouth 2 (two) times a day at 9am and 6pm.   Yes PROVIDER, HISTORICAL   isosorbide mononitrate (IMDUR) 30 MG 24 hr tablet Take 1 tablet (30 mg total) by mouth daily. 2/17/17  Yes Cuco Bennett MD   losartan (COZAAR) 100 MG tablet Take 1 tablet (100 mg total) by mouth  daily. 5/31/17  Yes Radha Choi MD   metoprolol tartrate (LOPRESSOR) 100 MG tablet Take 2 tablets by mouth two times daily 6/13/17  Yes Jenny Lion CNP   minoxidil (LONITEN) 2.5 MG tablet Take 1 tablet (2.5 mg total) by mouth daily.  Patient taking differently: Take 2.5 mg by mouth 2 (two) times a day.  4/11/17  Yes Cuco Bennett MD   multivitamin (MULTIVITAMIN) per tablet Take 1 tablet by mouth daily.   Yes PROVIDER, HISTORICAL   NIASPAN EXTENDED-RELEASE 500 mg CR tablet Take 1 tablet by mouth  daily with food 2/22/17  Yes Cuco Bennett MD   psyllium (METAMUCIL) 0.52 gram capsule Take 0.52 g by mouth daily.   Yes PROVIDER, HISTORICAL   warfarin (COUMADIN) 5 MG tablet Take 5 mg Sun, Tue, Thurs and 7.5 mg all other days. 2/17/17  Yes Radha Choi MD       Allergies:   Allergies   Allergen Reactions     Contrast [Iohexol]      Contrast Dye     Iodinated Contrast- Oral And Iv Dye Hives     And shortness of breath.   This is the contrast dye used in angiograms     Iodine Hives     And convulsions from ivp dye     Lactose Diarrhea     Scallops Nausea And Vomiting       Immunizations:   Immunization History   Administered Date(s) Administered     Influenza, inj, historic 11/12/2007, 10/09/2008     Pneumo Conj 13-V (2010&after) 05/05/2016     Pneumo Polysac 23-V 04/20/2006     Td, historic 06/15/2006     Tdap 05/05/2016     ZOSTER 05/12/2016       Exam Chest clear to auscultation and percussion.  Heart tones regular rhythm without murmur rub or gallop.  Abdomen soft nontender no organomegaly.  No peritoneal signs.  Extremities free of edema cyanosis or clubbing.  Neck veins nondistended no thyromegaly or scleral icterus noted, carotids full.  Skin warm and dry easily conversant good spirited.  Normal intelligence.  Neurologically intact no gross localizing findings.  Weekend appearance not in acute distress uses a wheeled walker for ambulation no recent falls.  Denies polyuria polydipsia  no symptoms of peripheral neuropathy.    Assessment and Plan  Diabetes mellitus type 2 check blood sugar A1c lipid panel today.  Patient is an uric and makes no urinalysis.  End-stage renal disease on hemodialysis 3 times a week well-tolerated.    Multiple drug allergies including iodinated contrast material iodine lactose and scallops.    Hypertension controlled with hyperlipidemia no recent heart attack or stroke.  RTC 2 months.    Time: total time spent with the patient was 25 minutes of which >50% was spent in counseling and coordination of care    The following high BMI interventions were performed this visit: encouragement to exercise    Cuco Bennett MD    Patient Active Problem List   Diagnosis     Aneurysm Of The Abdominal Aorta     Type 2 diabetes mellitus with kidney complication     Chronic Renal Failure With Uremic Nephropathy     Vascular dementia     Depression     Lactose intolerance     Unstable angina     Peripheral artery disease     Typical atrial flutter     Essential hypertension     Hyperlipidemia LDL goal <70     Anemia     Allergy to iodine compound     Atherosclerosis of aorta     ESRD (end stage renal disease) on dialysis     Coronary artery disease due to calcified coronary lesion     Atrial fibrillation [I48.91]

## 2021-06-12 NOTE — PROGRESS NOTES
INR 1.5 pt had fistulagram for dialysis cath. Will increase tue dose to 7.5 mg and then to current dose and retest 9/8/. After talking with pt and discussing history of greens/salads and medication change. Pt will  continue  with current diet and dosing of Warfarin.  Continue with moderation of Vit K and green leafy vegetables. Cautioned to call with increase bruising or bleeding. Reminded to call with medication change especially antibiotic. Call with any questions or concerns or any up coming procedures. Cautioned about using Herbal medication.

## 2021-06-12 NOTE — PROGRESS NOTES
Office Visit - Physical    Manuel Marrero   77 y.o. male    Date of Visit: 7/25/2017    Chief Complaint   Patient presents with     Pre-op Exam     Cataract left eye 7/27/2017 Dr. Gomez Garcia at [facility] fax [phone]       Subjective: Preoperative examination.  Cataract surgery left and right eyes first on the left July 27, 2017 with Dr[facility] of [facility] fax number there [phone].    Impaired vision for this 77-year-old male with multiple medical problems.  The patient is a non-smoker he does not abuse alcohol he is allergic to contrast media iodine lactose scallops.    Recent cardioversion for atrial fibrillation.  Underlying three-vessel coronary artery disease and ischemic cardiomyopathy.  PHQ 9 score measured 1 today.  Colonoscopy last done October 31, 2013 Dr. Patel.  Negative no cancer only diverticulosis.    Recent CT scan of the abdomen showed cholelithiasis.    ROS: A comprehensive review of systems was performed and was otherwise negative    Medications:   Prior to Admission medications    Medication Sig Start Date End Date Taking? Authorizing Provider   aspirin 81 MG tablet Take 81 mg by mouth daily.   Yes PROVIDER, HISTORICAL   atorvastatin (LIPITOR) 80 MG tablet Take 1 tablet (80 mg total) by mouth bedtime. 2/17/17  Yes Radha Choi MD   calcium acetate (PHOSLO) 667 mg capsule Take 1,334 mg by mouth 3 (three) times a day with meals.   Yes PROVIDER, HISTORICAL   cinacalcet (SENSIPAR) 30 MG tablet Take 30 mg by mouth every evening.   Yes PROVIDER, HISTORICAL   cloNIDine HCl (CATAPRES) 0.1 MG tablet Take 1 tablet by mouth  twice a day 12/12/16  Yes Cuco Bennett MD   furosemide (LASIX) 80 MG tablet Take 80 mg by mouth 2 (two) times a day at 9am and 6pm.   Yes PROVIDER, HISTORICAL   isosorbide mononitrate (IMDUR) 30 MG 24 hr tablet Take 1 tablet (30 mg total) by mouth daily. 2/17/17  Yes Cuco Bennett MD   losartan  (COZAAR) 100 MG tablet Take 1 tablet (100 mg total) by mouth daily. 5/31/17  Yes Radha Choi MD   metoprolol tartrate (LOPRESSOR) 100 MG tablet Take 2 tablets by mouth two times daily 6/13/17  Yes Jenny Lion CNP   minoxidil (LONITEN) 2.5 MG tablet Take 1 tablet (2.5 mg total) by mouth daily.  Patient taking differently: Take 2.5 mg by mouth 2 (two) times a day.  4/11/17  Yes Cuco Bennett MD   multivitamin (MULTIVITAMIN) per tablet Take 1 tablet by mouth daily.   Yes PROVIDER, HISTORICAL   psyllium (METAMUCIL) 0.52 gram capsule Take 0.52 g by mouth daily.   Yes PROVIDER, HISTORICAL   warfarin (COUMADIN) 5 MG tablet Take 5 mg Sun, Tue, Thurs and 7.5 mg all other days. 2/17/17  Yes Radha Choi MD   NIASPAN EXTENDED-RELEASE 500 mg CR tablet Take 1 tablet by mouth  daily with food 2/22/17   Cuco Bennett MD       Allergies:  Allergies   Allergen Reactions     Contrast [Iohexol]      Contrast Dye     Iodinated Contrast- Oral And Iv Dye Hives     And shortness of breath.   This is the contrast dye used in angiograms     Iodine Hives     And convulsions from ivp dye     Lactose Diarrhea     Scallops Nausea And Vomiting       Immunizations:   Immunization History   Administered Date(s) Administered     Influenza, inj, historic 11/12/2007, 10/09/2008     Pneumo Conj 13-V (2010&after) 05/05/2016     Pneumo Polysac 23-V 04/20/2006     Td, historic 06/15/2006     Tdap 05/05/2016     ZOSTER 05/12/2016       Health Maintenance: Immunizations reviewed and up-to-date.  Colonoscopy as noted previously.    Past Medical History: Coronary artery disease with ischemic cardiomyopathy no recent intervention that is no angioplasty or stents or bypass offered.  Comorbidities significant including end-stage renal disease on hemodialysis 3 times per week for the last 9 years.  Hyperglycemia by history with hyperlipidemia.    Past Surgical History: Prostatectomy for prostate cancer 2004 no recurrence.  No  radiation or chemotherapy.  For prostate cancer.    Family History: Mother  age 62 cerebral hemorrhage.  Father  age 80 uncertain cause.  Wife  61 lung cancer heavy smoker.  2 sons both well early 50s later 40s.        Social History: Hemodialysis 3 times a week for 9 years.  Retired.    Exam Chest clear to auscultation and percussion.  Heart tones regular rhythm without murmur rub or gallop.  Abdomen soft nontender no organomegaly.  No peritoneal signs.  Extremities free of edema cyanosis or clubbing.  Neck veins nondistended no thyromegaly or scleral icterus noted, carotids full.  Skin warm and dry easily conversant good spirited.  Normal intelligence.  Neurologically intact no gross localizing findings.    Assessment and Plan  Bilateral cataracts need surgical intervention first on the left 2017.    Hyperglycemia with hyperlipidemia and history of prostate cancer thankfully no sign of recurrence.    End-stage renal disease on hemodialysis for 9 years.    History of atrial fibrillation status post successful cardioversion ×1.  On warfarin therapy INR pending.    History of hypokalemia recheck potassium to be done on potassium supplement at this juncture.  RTC 1 month after cataract surgeries have been completed.    Medically acceptable risk for anticipated surgical intervention.  Bilateral cataract surgeries.    Total time spent with the patient today was 40 minutes of which greater than 50% was spent in counseling and coordination of care.    Cuco Bennett MD    Patient Active Problem List   Diagnosis     Aneurysm Of The Abdominal Aorta     Type 2 diabetes mellitus with kidney complication     Chronic Renal Failure With Uremic Nephropathy     Vascular dementia     Depression     Lactose intolerance     Unstable angina     Peripheral artery disease     Typical atrial flutter     Essential hypertension     Hyperlipidemia LDL goal <70     Anemia     Allergy to iodine compound      Atherosclerosis of aorta     ESRD (end stage renal disease) on dialysis     Coronary artery disease due to calcified coronary lesion     Atrial fibrillation [I48.91]

## 2021-06-12 NOTE — PROGRESS NOTES
INR 2.4 INR stable. Discussed continuing management of dose of Warfarin and returning in one month . No changes to diet needed at this time. Continue moderate intake of Vitamin K and call if increase bruising or unexplained bleeding. Call with medication changes or upcoming procedures.

## 2021-06-14 NOTE — PROGRESS NOTES
INR 1.7 will increase Tue dose this week to 7.5 mg then back to usual dose. Retest in 2 weeks. Continue current management dosing of Warfarin. Continue  diet of moderate Vitamin K intake. Discussed with pt the need to call with questions or concerns or any change in medication especially herbal medication or OTC. Call with increased bleeding or bruising or any upcoming procedures.

## 2021-06-14 NOTE — PROGRESS NOTES
Office Visit - Follow up    Manuel Marrero   77 y.o. male    Date of Visit: 11/7/2017    Chief Complaint   Patient presents with     Hypertension     Hyperlipidemia     Diabetes       Subjective: Diabetes mellitus type 2 with hypertension hyperlipidemia and end-stage renal disease on hemodialysis for the last 9 years 3 times a week.  Tolerates it quite well without hypotension.    No blood in stool or patient is an uric.  Denies chest pain or shortness of breath medication list reviewed.  Allergies contrast.  Iodine.  Lactulose and scallops the latter causing nausea and vomiting.  The patient's med list is reviewed and generally well-tolerated.  The patient has had diet-controlled diabetes mellitus type 2.    ROS: A comprehensive review of systems was performed and was otherwise negative    Medications:  Prior to Admission medications    Medication Sig Start Date End Date Taking? Authorizing Provider   aspirin 81 MG tablet Take 81 mg by mouth daily.   Yes PROVIDER, HISTORICAL   atorvastatin (LIPITOR) 80 MG tablet Take 1 tablet (80 mg total) by mouth bedtime. 2/17/17  Yes Radha Chio MD   calcium acetate (PHOSLO) 667 mg capsule Take 1,334 mg by mouth 3 (three) times a day with meals.   Yes PROVIDER, HISTORICAL   cinacalcet (SENSIPAR) 30 MG tablet Take 30 mg by mouth every evening.   Yes PROVIDER, HISTORICAL   cloNIDine HCl (CATAPRES) 0.1 MG tablet Take 1 tablet by mouth  twice a day 12/12/16  Yes Cuco Bennett MD   furosemide (LASIX) 80 MG tablet Take 80 mg by mouth 2 (two) times a day at 9am and 6pm.   Yes PROVIDER, HISTORICAL   isosorbide mononitrate (IMDUR) 30 MG 24 hr tablet Take 1 tablet (30 mg total) by mouth daily. 2/17/17  Yes Cuco Bennett MD   losartan (COZAAR) 100 MG tablet Take 1 tablet (100 mg total) by mouth daily. 5/31/17  Yes Radha Choi MD   metoprolol tartrate (LOPRESSOR) 100 MG tablet Take 2 tablets by mouth two times daily 6/13/17  Yes Jenny Lion, CNP    minoxidil (LONITEN) 2.5 MG tablet Take 1 tablet (2.5 mg total) by mouth daily. 4/11/17  Yes Cuco Bennett MD   multivitamin (MULTIVITAMIN) per tablet Take 1 tablet by mouth daily.   Yes PROVIDER, HISTORICAL   NIASPAN EXTENDED-RELEASE 500 mg CR tablet Take 1 tablet by mouth  daily with food 2/22/17  Yes Cuco Bennett MD   psyllium (METAMUCIL) 0.52 gram capsule Take 0.52 g by mouth daily.   Yes PROVIDER, HISTORICAL   warfarin (COUMADIN) 5 MG tablet TAKE 1 TABLET BY MOUTH ON  SUNDAY, TUESDAY AND  THURSDAY, AND TAKE 1 AND  1/2 TABLETS ALL THE OTHER  DAYS 10/9/17  Yes Radha Choi MD   diphenhydrAMINE (BENADRYL) 25 mg tablet Take 25 mg by mouth as needed.    PROVIDER, HISTORICAL   atorvastatin (LIPITOR) 80 MG tablet TAKE 1 TABLET BY MOUTH AT  BEDTIME 10/9/17 11/7/17  Radha Choi MD       Allergies:   Allergies   Allergen Reactions     Contrast [Iohexol]      Contrast Dye     Iodinated Contrast- Oral And Iv Dye Hives     And shortness of breath.   This is the contrast dye used in angiograms     Iodine Hives     And convulsions from ivp dye     Lactose Diarrhea     Scallops Nausea And Vomiting       Immunizations:   Immunization History   Administered Date(s) Administered     Influenza, inj, historic,unspecified 11/12/2007, 10/09/2008     Pneumo Conj 13-V (2010&after) 05/05/2016     Pneumo Polysac 23-V 04/20/2006     Td,adult,historic,unspecified 06/15/2006     Tdap 05/05/2016     ZOSTER 05/12/2016       Exam Chest clear to auscultation and percussion.  Heart tones regular rhythm without murmur rub or gallop.  Abdomen soft nontender no organomegaly.  No peritoneal signs.  Extremities free of edema cyanosis or clubbing.  Neck veins nondistended no thyromegaly or scleral icterus noted, carotids full.  Skin warm and dry easily conversant good spirited.  Normal intelligence.  Neurologically intact no gross localizing findings.  Wheeled walker for ambulation the patient is an uric.  Blood  pressure is good 98/60 patient does not feel like spells.  He denies chest pain or shortness of breath pulse 72 regular respirations 18 and unlabored.  Chronically ill-appearing but not in acute distress hemodialysis access catheter right upper extremity blood pressure check left upper extremity at the brachial artery for the case sounds.  Well-tolerated.    Assessment and Plan  Diabetes mellitus type 2 check A1c plus blood sugar lipid panel.  It is diet control.    Hypertension and hyperlipidemia and end-stage renal disease on hemodialysis 3 times a week for the last 9+ years tolerated well.    Depression PHQ 9 score today measured 1.    Time: total time spent with the patient was 25 minutes of which >50% was spent in counseling and coordination of care        Cuco Bennett MD    Patient Active Problem List   Diagnosis     Aneurysm Of The Abdominal Aorta     Type 2 diabetes mellitus with kidney complication     Chronic Renal Failure With Uremic Nephropathy     Vascular dementia     Depression     Lactose intolerance     Unstable angina     Peripheral artery disease     Typical atrial flutter     Essential hypertension     Hyperlipidemia LDL goal <70     Anemia     Allergy to iodine compound     Atherosclerosis of aorta     ESRD (end stage renal disease) on dialysis     Coronary artery disease due to calcified coronary lesion     Atrial fibrillation [I48.91]

## 2021-06-15 NOTE — PROGRESS NOTES
"Office Visit - Follow up    Manuel Marrero   77 y.o. male    Date of Visit: 2/6/2018    Chief Complaint   Patient presents with     Abdominal Pain     follow up     Diabetes     Hypertension     Coronary Artery Disease       Subjective: abd. pain.    One-month follow-up.  CT scan of abdomen done showing a solid or partially complex cyst with thickened rim lower pole right kidney about an inch and an 8 x 8\" and a quarter in size.  Discussed with patient and the patient's son he is basically and uric he is suffering from end-stage renal disease on hemodialysis.  He has symptoms of peripheral neuropathy may be attributable to diabetes mellitus type 2.  Also history of generalized atherosclerosis end-stage renal disease and hypertension.  The patient notes no blood in stool no blood in urine basically's and uric.  Medication list reviewed well-tolerated.    ROS: A comprehensive review of systems was performed and was otherwise negative    Medications:  Prior to Admission medications    Medication Sig Start Date End Date Taking? Authorizing Provider   aspirin 81 MG tablet Take 81 mg by mouth daily.   Yes PROVIDER, HISTORICAL   atorvastatin (LIPITOR) 80 MG tablet Take 1 tablet (80 mg total) by mouth bedtime. 2/17/17  Yes Radha Choi MD   calcium acetate (PHOSLO) 667 mg capsule Take 1,334 mg by mouth 3 (three) times a day with meals.   Yes PROVIDER, HISTORICAL   cinacalcet (SENSIPAR) 30 MG tablet Take 30 mg by mouth every evening.   Yes PROVIDER, HISTORICAL   cloNIDine HCl (CATAPRES) 0.1 MG tablet TAKE 1 TABLET BY MOUTH  TWICE A DAY 1/11/18  Yes Neo Garcia MD   furosemide (LASIX) 80 MG tablet TAKE 2 TABLETS BY MOUTH  DAILY 12/11/17  Yes Cuco Bennett MD   losartan (COZAAR) 100 MG tablet Take 1 tablet (100 mg total) by mouth daily. 5/31/17  Yes Radha Choi MD   metoprolol tartrate (LOPRESSOR) 100 MG tablet Take 2 tablets by mouth two times daily 6/13/17  Yes Jenny Lion CNP   minoxidil " (LONITEN) 2.5 MG tablet Take 1 tablet (2.5 mg total) by mouth daily. 4/11/17  Yes Cuco Bennett MD   multivitamin (MULTIVITAMIN) per tablet Take 1 tablet by mouth daily.   Yes PROVIDER, HISTORICAL   NIASPAN EXTENDED-RELEASE 500 mg CR tablet Take 1 tablet by mouth  daily with food 2/22/17  Yes Cuco Bennett MD   psyllium (METAMUCIL) 0.52 gram capsule Take 0.52 g by mouth daily.   Yes PROVIDER, HISTORICAL   warfarin (COUMADIN) 5 MG tablet TAKE 1 TABLET BY MOUTH ON  SUNDAY, TUESDAY AND  THURSDAY, AND TAKE 1 AND  1/2 TABLETS ALL THE OTHER  DAYS 1/11/18  Yes Radha Choi MD   diphenhydrAMINE (BENADRYL) 25 mg tablet Take 25 mg by mouth as needed.    PROVIDER, HISTORICAL       Allergies:   Allergies   Allergen Reactions     Contrast [Iohexol]      Contrast Dye     Iodinated Contrast- Oral And Iv Dye Hives     And shortness of breath.   This is the contrast dye used in angiograms     Iodine Hives     And convulsions from ivp dye     Lactose Diarrhea     Scallops Nausea And Vomiting       Immunizations:   Immunization History   Administered Date(s) Administered     Influenza, inj, historic,unspecified 11/12/2007, 10/09/2008     Pneumo Conj 13-V (2010&after) 05/05/2016     Pneumo Polysac 23-V 04/20/2006     Td,adult,historic,unspecified 06/15/2006     Tdap 05/05/2016     ZOSTER 05/12/2016       Exam Chest clear to auscultation and percussion.  Heart tones regular rhythm without murmur rub or gallop.  Abdomen soft nontender no organomegaly.  No peritoneal signs.  Extremities free of edema cyanosis or clubbing.  Neck veins nondistended no thyromegaly or scleral icterus noted, carotids full.  Skin warm and dry easily conversant good spirited.  Normal intelligence.  Neurologically intact no gross localizing findings.  Weight down 2 more pounds in a wheelchair week and near cachexia.  Seen by gastroenterologist regarding abdominal pain who thought it was musculoskeletal in etiology.    Multiple allergies  "including contrast iodine lactulose and scallops the latter causing nausea and vomiting.    Assessment and Plan  Abdominal pain uncertain etiology may be attributable to a cyst in the lower pole the right kidney 1.125\" x 1.25\" in size discussed in detail with the patient and his son Armando Marrero Junior.  Suggest [facility] consult.    End-stage renal disease on hemodialysis with history of diabetes mellitus and peripheral neuropathy lower extremities dysesthesias.  Hypertension and generalized atherosclerotic cardiovascular disease.  CAD.  RTC 1 month's time.    Time: total time spent with the patient was 25 minutes of which >50% was spent in counseling and coordination of care        Cuco Bennett MD    Patient Active Problem List   Diagnosis     Aneurysm Of The Abdominal Aorta     Type 2 diabetes mellitus with kidney complication     Chronic Renal Failure With Uremic Nephropathy     Vascular dementia     Depression     Lactose intolerance     Unstable angina     Peripheral artery disease     Typical atrial flutter     Essential hypertension     Hyperlipidemia LDL goal <70     Anemia     Allergy to iodine compound     Atherosclerosis of aorta     ESRD (end stage renal disease) on dialysis     Coronary artery disease due to calcified coronary lesion     Atrial fibrillation [I48.91]     "

## 2021-06-15 NOTE — PROGRESS NOTES
Son called that pt having bleeding issues. Will decrease dose and retest on 1/8. After talking with pt and discussing history of greens/salads and medication change. Pt will  continue  with current diet and dosing of Warfarin.  Continue with moderation of Vit K and green leafy vegetables. Cautioned to call with increase bruising or bleeding. Reminded to call with medication change especially antibiotic. Call with any questions or concerns or any up coming procedures. Cautioned about using Herbal medication.

## 2021-06-15 NOTE — PROGRESS NOTES
INR stable. Discussed continuing management of dose of Warfarin and returning in one month . No changes to diet needed at this time. Continue moderate intake of Vitamin K and call if increase bruising or unexplained bleeding. Call with medication changes or upcoming procedures.  INR 2.4

## 2021-06-15 NOTE — PROGRESS NOTES
INR 1.4 increase tue to 7.5 mg and then usual schedule and retest in one week. After talking with pt and discussing history of greens/salads and medication change. Pt will  continue  with current diet and dosing of Warfarin.  Continue with moderation of Vit K and green leafy vegetables. Cautioned to call with increase bruising or bleeding. Reminded to call with medication change especially antibiotic. Call with any questions or concerns or any up coming procedures. Cautioned about using Herbal medication.

## 2021-06-15 NOTE — PROGRESS NOTES
INTERVENTIONAL RADIOLOGY    Pt here for fistulogram for high pressures and prolonged bleeding ~10 days ago.  Since then no bleeding issues at dialysis as they optimized Warfarin/INR and BP.  Son feels like there currently are not any problems.    Robust palpable thrill in RUE fistula.    D/W Dr. Medeiros of Nephrology.  All agree to defer fistulogram pending any new problems.    Plan:  Discharge to home.  OK to dialyze via fistula.

## 2021-06-15 NOTE — PROGRESS NOTES
Office Visit - Follow up    Manuel Marrero   77 y.o. male    Date of Visit: 1/9/2018    Chief Complaint   Patient presents with     Abdominal Pain     right side to back comes and goes uses Tylenol      Leg Pain     and weakness      Diabetes     Coronary Artery Disease     Hypertension       Subjective: Abdominal pain and diabetic.  77 years of age.  History of coronary artery disease and hypertension also end-stage renal disease on hemodialysis for a number of years.    Patient describes the pain as being in the right abdomen with radiation to the back comes and goes also associated with leg pain uses acetaminophen which seems to help.  The patient wished to stop the furosemide and that he feels he is read that that could cause abdominal pain with radiation to the back.  Labs are to be done along with CT scan abdomen and pelvis.  No contrast.  No blood in stool or urine medication list reviewed minimal or no urine output.    ROS: A comprehensive review of systems was performed and was otherwise negative    Medications:  Prior to Admission medications    Medication Sig Start Date End Date Taking? Authorizing Provider   aspirin 81 MG tablet Take 81 mg by mouth daily.   Yes PROVIDER, HISTORICAL   atorvastatin (LIPITOR) 80 MG tablet Take 1 tablet (80 mg total) by mouth bedtime. 2/17/17  Yes Radha Choi MD   calcium acetate (PHOSLO) 667 mg capsule Take 1,334 mg by mouth 3 (three) times a day with meals.   Yes PROVIDER, HISTORICAL   cinacalcet (SENSIPAR) 30 MG tablet Take 30 mg by mouth every evening.   Yes PROVIDER, HISTORICAL   cloNIDine HCl (CATAPRES) 0.1 MG tablet Take 1 tablet by mouth  twice a day 12/12/16  Yes Cuco Bennett MD   furosemide (LASIX) 80 MG tablet TAKE 2 TABLETS BY MOUTH  DAILY 12/11/17  Yes Cuco Bennett MD   losartan (COZAAR) 100 MG tablet Take 1 tablet (100 mg total) by mouth daily. 5/31/17  Yes Radha Choi MD   metoprolol tartrate (LOPRESSOR) 100 MG tablet Take  2 tablets by mouth two times daily 6/13/17  Yes Jenny Lion CNP   minoxidil (LONITEN) 2.5 MG tablet Take 1 tablet (2.5 mg total) by mouth daily. 4/11/17  Yes Cuco Bennett MD   multivitamin (MULTIVITAMIN) per tablet Take 1 tablet by mouth daily.   Yes PROVIDER, HISTORICAL   NIASPAN EXTENDED-RELEASE 500 mg CR tablet Take 1 tablet by mouth  daily with food 2/22/17  Yes Cuco Bennett MD   psyllium (METAMUCIL) 0.52 gram capsule Take 0.52 g by mouth daily.   Yes PROVIDER, HISTORICAL   warfarin (COUMADIN) 5 MG tablet TAKE 1 TABLET BY MOUTH ON  SUNDAY, TUESDAY AND  THURSDAY, AND TAKE 1 AND  1/2 TABLETS ALL THE OTHER  DAYS 10/9/17  Yes Radha Choi MD   diphenhydrAMINE (BENADRYL) 25 mg tablet Take 25 mg by mouth as needed.    PROVIDER, HISTORICAL   isosorbide mononitrate (IMDUR) 30 MG 24 hr tablet TAKE 1 TABLET BY MOUTH  DAILY 1/4/18 1/9/18  Cuco Bennett MD       Allergies:   Allergies   Allergen Reactions     Contrast [Iohexol]      Contrast Dye     Iodinated Contrast- Oral And Iv Dye Hives     And shortness of breath.   This is the contrast dye used in angiograms     Iodine Hives     And convulsions from ivp dye     Lactose Diarrhea     Scallops Nausea And Vomiting       Immunizations:   Immunization History   Administered Date(s) Administered     Influenza, inj, historic,unspecified 11/12/2007, 10/09/2008     Pneumo Conj 13-V (2010&after) 05/05/2016     Pneumo Polysac 23-V 04/20/2006     Td,adult,historic,unspecified 06/15/2006     Tdap 05/05/2016     ZOSTER 05/12/2016       Exam Chest clear to auscultation and percussion.  Heart tones regular rhythm without murmur rub or gallop.  Abdomen soft nontender no organomegaly.  No peritoneal signs.  Extremities free of edema cyanosis or clubbing.  Neck veins nondistended no thyromegaly or scleral icterus noted, carotids full.  Skin warm and dry easily conversant good spirited.  Normal intelligence.  Neurologically intact no gross localizing  findings.  He is chronically weak and after being on hemodialysis for a number of years for end-stage renal disease.  Uses a wheeled walker for ambulation and sitting is not toxic or acutely ill is easily conversant and able to answer all questions.  Good insight somewhat hard of hearing.  Note of vascular dementia previously done.    Assessment and Plan  Abdominal pain in type II diabetic with history of insulin resistance and BMI today 23.90.  O2 sats 94% pulse 63 and regular blood pressure normal for this patient check hemogram plus basic metabolic profile and hepatic profile lipid panel A1c TSH H urine for microalbumin as well.    Multiple drug allergies including contrast mirror material or iodine lactulose or lactulose and scallops.  Not lactulose but lactulose.  History of coronary artery disease and typical atrial flutter with hypertension hyperlipidemia.  In describing very guarded check A1c blood sugar in the basic metabolic profile plus hepatic profile TSH.  Return to clinic 1 month's time.    Time: total time spent with the patient was 40 minutes of which >50% was spent in counseling and coordination of care        Cuco Bennett MD    Patient Active Problem List   Diagnosis     Aneurysm Of The Abdominal Aorta     Type 2 diabetes mellitus with kidney complication     Chronic Renal Failure With Uremic Nephropathy     Vascular dementia     Depression     Lactose intolerance     Unstable angina     Peripheral artery disease     Typical atrial flutter     Essential hypertension     Hyperlipidemia LDL goal <70     Anemia     Allergy to iodine compound     Atherosclerosis of aorta     ESRD (end stage renal disease) on dialysis     Coronary artery disease due to calcified coronary lesion     Atrial fibrillation [I48.91]

## 2021-06-15 NOTE — PROGRESS NOTES
INR 2.2 holding for fistulagram on 1/11. Will restart that evening and retest on 1/22. After talking with pt and discussing history of greens/salads and medication change. Pt will  continue  with current diet and dosing of Warfarin.  Continue with moderation of Vit K and green leafy vegetables. Cautioned to call with increase bruising or bleeding. Reminded to call with medication change especially antibiotic. Call with any questions or concerns or any up coming procedures. Cautioned about using Herbal medication.

## 2021-06-16 NOTE — PROGRESS NOTES
Office Visit - Follow up    Manuel Marrero   77 y.o. male    Date of Visit: 3/20/2018    Chief Complaint   Patient presents with     Chronic Kidney Disease     Diabetes     Hypertension     ER follow up   hypotension       Subjective: Diabetes mellitus type 2 on insulin and chronic kidney disease hemodialysis dependent 3 times per week.  Diabetes mellitus type 2 with chronic kidney disease and hypertension.    During dialysis run of February 14, 2018 the patient had a hypotensive spell that was not associated with loss of consciousness or chest pain or shortness of breath.  The patient is noted no blood in his stool he is basically and uric.  The patient was seen and evaluated in the emergency room on that date and was discharged.    Medication list reviewed well-tolerated.  Allergy includes contrast media plus lactulose scallops and iodine the latter causing hives.    ROS: A comprehensive review of systems was performed and was otherwise negative    Medications:  Prior to Admission medications    Medication Sig Start Date End Date Taking? Authorizing Provider   aspirin 81 MG tablet Take 81 mg by mouth daily.   Yes PROVIDER, HISTORICAL   atorvastatin (LIPITOR) 80 MG tablet Take 1 tablet (80 mg total) by mouth bedtime. 2/17/17  Yes Radha Choi MD   calcium acetate (PHOSLO) 667 mg capsule Take 1,334 mg by mouth 3 (three) times a day with meals.   Yes PROVIDER, HISTORICAL   cinacalcet (SENSIPAR) 30 MG tablet Take 30 mg by mouth every evening.   Yes PROVIDER, HISTORICAL   cloNIDine HCl (CATAPRES) 0.1 MG tablet TAKE 1 TABLET BY MOUTH  TWICE A DAY 1/11/18  Yes Neo Garcia MD   furosemide (LASIX) 80 MG tablet TAKE 2 TABLETS BY MOUTH  DAILY 12/11/17  Yes Cuco Bennett MD   losartan (COZAAR) 100 MG tablet Take 1 tablet (100 mg total) by mouth daily. 5/31/17  Yes Radha Choi MD   metoprolol tartrate (LOPRESSOR) 100 MG tablet Take 2 tablets by mouth two times daily 6/13/17  Yes Jenny Lion  CNP   multivitamin (MULTIVITAMIN) per tablet Take 1 tablet by mouth daily.   Yes PROVIDER, HISTORICAL   NIASPAN EXTENDED-RELEASE 500 mg CR tablet TAKE 1 TABLET BY MOUTH  DAILY WITH FOOD 3/4/18  Yes Cuco Bennett MD   psyllium (METAMUCIL) 0.52 gram capsule Take 0.52 g by mouth daily.   Yes PROVIDER, HISTORICAL   warfarin (COUMADIN) 5 MG tablet TAKE 1 TABLET BY MOUTH ON  SUNDAY, TUESDAY AND  THURSDAY, AND TAKE 1 AND  1/2 TABLETS ALL THE OTHER  DAYS 1/11/18  Yes Radha Choi MD   diphenhydrAMINE (BENADRYL) 25 mg tablet Take 25 mg by mouth as needed.    PROVIDER, HISTORICAL   minoxidil (LONITEN) 2.5 MG tablet Take 1 tablet (2.5 mg total) by mouth daily. 4/11/17 3/20/18  Cuco Bennett MD       Allergies:   Allergies   Allergen Reactions     Contrast [Iohexol]      Contrast Dye     Iodinated Contrast- Oral And Iv Dye Hives     And shortness of breath.   This is the contrast dye used in angiograms     Iodine Hives     And convulsions from ivp dye     Lactose Diarrhea     Scallops Nausea And Vomiting       Immunizations:   Immunization History   Administered Date(s) Administered     Influenza, inj, historic,unspecified 11/12/2007, 10/09/2008     Pneumo Conj 13-V (2010&after) 05/05/2016     Pneumo Polysac 23-V 04/20/2006     Td,adult,historic,unspecified 06/15/2006     Tdap 05/05/2016     ZOSTER, LIVE 05/12/2016       Exam Chest clear to auscultation and percussion.  Heart tones regular rhythm without murmur rub or gallop.  Abdomen soft nontender no organomegaly.  No peritoneal signs.  Extremities free of edema cyanosis or clubbing.  Neck veins nondistended no thyromegaly or scleral icterus noted, carotids full.  Skin warm and dry easily conversant good spirited.  Normal intelligence.  Neurologically intact no gross localizing findings.  Ambulates with the aid of a walker.  Left carotid bruit soft.  No carotid bruit on the right.  Dorsal thoracic kyphosis.  Chronically ill and weak appearing no  tachypnea not acutely ill or tachypneic.  No central or acrocyanosis noted    Assessment and Plan  Diabetes mellitus type 2 with end-stage renal disease and recent spell of hypotension with emergency room evaluation February 14, 2018.  Check today hemogram comprehensive metabolic profile lipid panel A1c and return to clinic 1 month's time.    Chronic kidney disease with end-stage renal disease stage IV hemodialysis dependent 3 times per week.  Hypotension with normal blood pressure today 110/64 pulse 65 O2 sats 98%.    Multiple drug allergies see above list reviewed.    Left carotid bruit asymptomatic.  Anuria    Time: total time spent with the patient was 40 minutes of which >50% was spent in counseling and coordination of care        Cuco Bennett MD    Patient Active Problem List   Diagnosis     Aneurysm Of The Abdominal Aorta     Type 2 diabetes mellitus with kidney complication     Chronic Renal Failure With Uremic Nephropathy     Vascular dementia     Depression     Lactose intolerance     Unstable angina     Peripheral artery disease     Typical atrial flutter     Essential hypertension     Hyperlipidemia LDL goal <70     Anemia     Allergy to iodine compound     Atherosclerosis of aorta     ESRD (end stage renal disease) on dialysis     Coronary artery disease due to calcified coronary lesion     Atrial fibrillation [I48.91]

## 2021-06-17 NOTE — PROGRESS NOTES
Office Visit - Follow up    Manuel Marrero   77 y.o. male    Date of Visit: 4/24/2018    Chief Complaint   Patient presents with     Diabetes     Hypertension       Subjective: Diabetes mellitus type 2 with chronic kidney disease and fact end-stage renal disease on hemodialysis ×9 years 3 times per week.    Coronary artery disease and multiple drug allergies as listed including contrast media iodine lack dose and scallops.  Nausea and vomiting for the latter.  Accompanied by his son he does have a shallow decubitus ulcer on his left buttocks cheek.  Mupirocin ointment to be applied thin layer.    No blood in stool or urine denies chest pain or shortness of breath.  Basically and uric.  Medication list reviewed generally well-tolerated normal effects.  Denies chest pain or shortness of breath.    ROS: A comprehensive review of systems was performed and was otherwise negative    Medications:  Prior to Admission medications    Medication Sig Start Date End Date Taking? Authorizing Provider   aspirin 81 MG tablet Take 81 mg by mouth daily.   Yes PROVIDER, HISTORICAL   atorvastatin (LIPITOR) 80 MG tablet Take 1 tablet (80 mg total) by mouth bedtime. 2/17/17  Yes Radha Choi MD   calcium acetate (PHOSLO) 667 mg capsule Take 1,334 mg by mouth 3 (three) times a day with meals.   Yes PROVIDER, HISTORICAL   cinacalcet (SENSIPAR) 30 MG tablet Take 30 mg by mouth every evening.   Yes PROVIDER, HISTORICAL   cloNIDine HCl (CATAPRES) 0.1 MG tablet TAKE 1 TABLET BY MOUTH  TWICE A DAY 1/11/18  Yes Neo Garica MD   furosemide (LASIX) 80 MG tablet TAKE 2 TABLETS BY MOUTH  DAILY 12/11/17  Yes Cuco Bennett MD   losartan (COZAAR) 100 MG tablet Take 1 tablet (100 mg total) by mouth daily. 5/31/17  Yes Radha Choi MD   metoprolol tartrate (LOPRESSOR) 100 MG tablet Take 2 tablets by mouth two times daily 6/13/17  Yes Jenny Lion CNP   multivitamin (MULTIVITAMIN) per tablet Take 1 tablet by mouth daily.    Yes PROVIDER, HISTORICAL   NIASPAN EXTENDED-RELEASE 500 mg CR tablet TAKE 1 TABLET BY MOUTH  DAILY WITH FOOD 3/4/18  Yes Cuco Bennett MD   psyllium (METAMUCIL) 0.52 gram capsule Take 0.52 g by mouth daily.   Yes PROVIDER, HISTORICAL   warfarin (COUMADIN) 5 MG tablet TAKE 1 TABLET BY MOUTH ON  SUNDAY, TUESDAY AND  THURSDAY, AND TAKE 1 AND  1/2 TABLETS ALL THE OTHER  DAYS 1/11/18  Yes Radha Choi MD   diphenhydrAMINE (BENADRYL) 25 mg tablet Take 25 mg by mouth as needed.    PROVIDER, HISTORICAL   mupirocin (BACTROBAN) 2 % ointment Apply to affected area 3 times daily 4/24/18 5/1/18  Cuco Bennett MD       Allergies:   Allergies   Allergen Reactions     Contrast [Iohexol]      Contrast Dye     Iodinated Contrast- Oral And Iv Dye Hives     And shortness of breath.   This is the contrast dye used in angiograms     Iodine Hives     And convulsions from ivp dye     Lactose Diarrhea     Scallops Nausea And Vomiting       Immunizations:   Immunization History   Administered Date(s) Administered     Influenza, inj, historic,unspecified 11/12/2007, 10/09/2008     Pneumo Conj 13-V (2010&after) 05/05/2016     Pneumo Polysac 23-V 04/20/2006     Td,adult,historic,unspecified 06/15/2006     Tdap 05/05/2016     ZOSTER, LIVE 05/12/2016       Exam Chest clear to auscultation and percussion.  Heart tones regular rhythm without murmur rub or gallop.  Abdomen soft nontender no organomegaly.  No peritoneal signs.  Extremities free of edema cyanosis or clubbing.  Neck veins nondistended no thyromegaly or scleral icterus noted, carotids full.  Skin warm and dry easily conversant good spirited.  Normal intelligence.  Neurologically intact no gross localizing findings.  Wheeled walker for ambulation on the left buttocks there is a shallow elliptical decubitus about 2 cm in length and 1/2 cm to three-quarter centimeter in width.  It is not secondarily infected no drainage or pus no surrounding cellulitis or  lymphangitis is noted.  It is shallow.    Assessment and Plan  Diabetes mellitus type 2 check A1c blood sugar and lipid panel today.    Anuric.    End-stage renal disease with an area continue hemodialysis 3 times a week as per the routine the last 9 years.    Multiple drug allergies including iodine lactose and scallops.    Coronary artery disease three-vessel small vessel disease not amenable to bypass or percutaneous coronary intervention.    Atrial flutter fibrillation on chronic warfarin therapy INR check is pending today.    Hypertension controlled.  136/60 pulse 60 respiration 18 and unlabored O2 sats 99%.    Time: total time spent with the patient was 25 minutes of which >50% was spent in counseling and coordination of care        Cuco Bennett MD    Patient Active Problem List   Diagnosis     Aneurysm Of The Abdominal Aorta     Type 2 diabetes mellitus with kidney complication     Chronic Renal Failure With Uremic Nephropathy     Vascular dementia     Depression     Lactose intolerance     Unstable angina     Peripheral artery disease     Typical atrial flutter     Essential hypertension     Hyperlipidemia LDL goal <70     Anemia     Allergy to iodine compound     Atherosclerosis of aorta     ESRD (end stage renal disease) on dialysis     Coronary artery disease due to calcified coronary lesion     Atrial fibrillation [I48.91]

## 2021-06-17 NOTE — PROGRESS NOTES
INR 3.1 After talking with pt and discussing history of greens/salads and medication change. Pt will  continue  with current diet and dosing of Warfarin.  Continue with moderation of Vit K and green leafy vegetables. Cautioned to call with increase bruising or bleeding. Reminded to call with medication change especially antibiotic. Call with any questions or concerns or any up coming procedures. Cautioned about using Herbal medication.   increase greens

## 2021-06-18 NOTE — LETTER
Letter by Cuco Bennett MD at      Author: Cuco Bennett MD Service: -- Author Type: --    Filed:  Encounter Date: 1/24/2019 Status: (Other)       Manuel Marrero  [address]  [place] [address]             January 24, 2019         Dear Mr. Marrero,    Below are the results from your recent visit:    Resulted Orders   HM2(CBC w/o Differential)   Result Value Ref Range    WBC 6.4 4.0 - 11.0 thou/uL    RBC 4.08 (L) 4.40 - 6.20 mill/uL    Hemoglobin 11.2 (L) 14.0 - 18.0 g/dL    Hematocrit 36.4 (L) 40.0 - 54.0 %    MCV 89 80 - 100 fL    MCH 27.5 27.0 - 34.0 pg    MCHC 30.8 (L) 32.0 - 36.0 g/dL    RDW 17.5 (H) 11.0 - 14.5 %    Platelets 243 140 - 440 thou/uL    MPV 8.5 7.0 - 10.0 fL   Comprehensive Metabolic Panel   Result Value Ref Range    Sodium 146 (H) 136 - 145 mmol/L    Potassium 4.8 3.5 - 5.0 mmol/L    Chloride 102 98 - 107 mmol/L    CO2 28 22 - 31 mmol/L    Anion Gap, Calculation 16 5 - 18 mmol/L    Glucose 109 70 - 125 mg/dL    BUN 43 (H) 8 - 28 mg/dL    Creatinine 9.02 (HH) 0.70 - 1.30 mg/dL    GFR MDRD Af Amer 7 (L) >60 mL/min/1.73m2    GFR MDRD Non Af Amer 6 (L) >60 mL/min/1.73m2    Bilirubin, Total 0.4 0.0 - 1.0 mg/dL    Calcium 10.3 8.5 - 10.5 mg/dL    Protein, Total 7.7 6.0 - 8.0 g/dL    Albumin 3.2 (L) 3.5 - 5.0 g/dL    Alkaline Phosphatase 124 (H) 45 - 120 U/L    AST 14 0 - 40 U/L    ALT 14 0 - 45 U/L    Narrative    Fasting Glucose reference range is 70-99 mg/dL per  American Diabetes Association (ADA) guidelines.   Uric Acid   Result Value Ref Range    Uric Acid 5.2 3.0 - 8.0 mg/dL       preop    Please call with questions or contact us using [employer].    Sincerely,        Electronically signed by Cuco Bennett MD

## 2021-06-18 NOTE — PROGRESS NOTES
Office Visit - Follow up    Manuel Marrero   78 y.o. male    Date of Visit: 5/22/2018    Chief Complaint   Patient presents with     Diabetes     Coronary Artery Disease       Subjective: Diabetes mellitus type 2 with generalized atherosclerotic cardiovascular disease and hemodialysis dependent end-stage renal disease.    Seen by vascular surgeon today and concerns were issued regarding the safety of surgery for this patient with multiple organ system problems stemming from diabetes to multiple drug allergies including contrast media lactose and scallops.    Pain in his legs are improved.  The ulcers in his legs are healing.  Dr. boston the vascular surgeon advised waiting on any peripheral vascular surgery or bypass.    The patient denies blood in stool or urine no chest pain or shortness of breath.    Medication list reviewed well-tolerated normal effects.    End-stage renal disease chronic kidney disease stage IV hemodialysis dependent for the last 10 years.  Hemodialysis is done 3 times per week he goes fairly smoothly without hypertension or side effects.  Accompanied today by his son Manuel.    ROS: A comprehensive review of systems was performed and was otherwise negative    Medications:  Prior to Admission medications    Medication Sig Start Date End Date Taking? Authorizing Provider   aspirin 81 MG tablet Take 81 mg by mouth daily.   Yes PROVIDER, HISTORICAL   atorvastatin (LIPITOR) 80 MG tablet Take 1 tablet (80 mg total) by mouth bedtime. 2/17/17  Yes Radha Choi MD   calcium acetate (PHOSLO) 667 mg capsule Take 1,334 mg by mouth 3 (three) times a day with meals.   Yes PROVIDER, HISTORICAL   cinacalcet (SENSIPAR) 30 MG tablet Take 30 mg by mouth every evening.   Yes PROVIDER, HISTORICAL   cloNIDine HCl (CATAPRES) 0.1 MG tablet TAKE 1 TABLET BY MOUTH  TWICE A DAY 5/8/18  Yes Cuco Bennett MD   furosemide (LASIX) 80 MG tablet TAKE 2 TABLETS BY MOUTH  DAILY 12/11/17  Yes Cuco Bennett,  MD   losartan (COZAAR) 100 MG tablet Take 1 tablet (100 mg total) by mouth daily.  Patient taking differently: Take 100 mg by mouth daily. One half tablet daily 5/31/17  Yes Radha Choi MD   metoprolol tartrate (LOPRESSOR) 100 MG tablet Take 2 tablets by mouth two times daily 6/13/17  Yes Jenny Lion CNP   minoxidil (LONITEN) 2.5 MG tablet Take 2.5 mg by mouth daily.   Yes PROVIDER, HISTORICAL   multivitamin (MULTIVITAMIN) per tablet Take 1 tablet by mouth daily.   Yes PROVIDER, HISTORICAL   NIASPAN EXTENDED-RELEASE 500 mg CR tablet TAKE 1 TABLET BY MOUTH  DAILY WITH FOOD 3/4/18  Yes Cuco Bennett MD   psyllium (METAMUCIL) 0.52 gram capsule Take 0.52 g by mouth daily.   Yes PROVIDER, HISTORICAL   warfarin (COUMADIN) 5 MG tablet Take 5 mg Sun, tue thur and 7.5 mg all other days. 5/2/18  Yes Jenny Lion CNP   diphenhydrAMINE (BENADRYL) 25 mg tablet Take 25 mg by mouth as needed.    PROVIDER, HISTORICAL       Allergies:   Allergies   Allergen Reactions     Contrast [Iohexol]      Contrast Dye     Iodinated Contrast- Oral And Iv Dye Hives     And shortness of breath.   This is the contrast dye used in angiograms     Iodine Hives     And convulsions from ivp dye     Lactose Diarrhea     Scallops Nausea And Vomiting       Immunizations:   Immunization History   Administered Date(s) Administered     Influenza, inj, historic,unspecified 11/12/2007, 10/09/2008     Pneumo Conj 13-V (2010&after) 05/05/2016     Pneumo Polysac 23-V 04/20/2006     Td,adult,historic,unspecified 06/15/2006     Tdap 05/05/2016     ZOSTER, LIVE 05/12/2016       Exam Chest clear to auscultation and percussion.  Heart tones regular rhythm without murmur rub or gallop.  Abdomen soft nontender no organomegaly.  No peritoneal signs.  Extremities free of edema cyanosis or clubbing.  Neck veins nondistended no thyromegaly or scleral icterus noted, carotids full.  Skin warm and dry easily conversant good spirited.  Normal intelligence.   Neurologically intact no gross localizing findings.  Frail elderly man in no acute distress O2 sats 98% mild dorsal thoracic kyphosis pulse 63 regular blood pressure 124/62 no leg edema no neck vein distention.    Assessment and Plan  End-stage renal disease and diabetes mellitus type 2 with peripheral vascular disease and generalized atherosclerotic process.  Multiple drug allergies as listed and noted above including contrast media iodine lactose and scallops.    The fact that his lower extremity leg pain is less and the fact that the ulcers in his legs are healing 0.2 avoiding surgery at this time Dr. boston the vascular surgeon concurs.  As to why.  Any operative intervention in this patient would be high risk and would be or should be proceeded cautiously.  Discussed in detail with the patient and his son no lab test today we will recheck patient 1 month's time to reassess his status at that time we will check A1c blood sugar and lipid panel.    Time: total time spent with the patient was 40 minutes of which >50% was spent in counseling and coordination of care        Cuco Bennett MD    Patient Active Problem List   Diagnosis     Aneurysm Of The Abdominal Aorta     Type 2 diabetes mellitus with kidney complication     Chronic Renal Failure With Uremic Nephropathy     Vascular dementia     Depression     Lactose intolerance     Unstable angina     Peripheral artery disease     Typical atrial flutter     Essential hypertension     Hyperlipidemia LDL goal <70     Anemia     Allergy to iodine compound     Atherosclerosis of aorta     ESRD (end stage renal disease) on dialysis     Coronary artery disease due to calcified coronary lesion     Atrial fibrillation [I48.91]

## 2021-06-19 ENCOUNTER — HEALTH MAINTENANCE LETTER (OUTPATIENT)
Age: 81
End: 2021-06-19

## 2021-06-19 NOTE — PROCEDURES
INTERVENTIONAL RADIOLOGY PROCEDURE NOTE    Patient Name: Manuel Marrero  Medical Record Number: [id number]  YOB: 1940    Date/Time: 7/24/2018 10:11 AM    Procedure: RUE fistulogram     Diagnosis: Renal Failure    Medications: Versed 1.5mg IV and Fentanyl 75mcg IV    Contrast: Omnipaque 350, 40mL    Fluoroscopy Time: 2.1 min    EBL: None    Complications: None    Specimens Sent: None    Findings: AVF demonstrates stenosis in the juxta-anastomotic vein s/p successful angioplasty, patent central veins and AV anastomosis    Plan: Post procedure monitoring    Vibha Polk

## 2021-06-19 NOTE — PROGRESS NOTES
[facility]    Facility:   [facility] [[id number]]   Code Status: FULL CODE      CHIEF COMPLAINT/REASON FOR VISIT:  Chief Complaint   Patient presents with     Review Of Multiple Medical Conditions       HISTORY:      HPI: Manuel is a 78 y.o. male who has a complex medical history and presented to the hospital after falling.  The fall was not witnessed, but his son lives with him about his father on the floor after he heard him banging on the door for help.  Apparently his dad had some chest pain, shortness of breath, and palpitations prior to falling.  There was no associated dizziness or lightheadedness.  His son had noticed that on Friday, 6/15/18 that he started to complain of some vague chest pain and bilateral leg cramping and that he just did not look well and was walking more gingerly and struggling to keep his balance.  This was after he had undergone his dialysis for the day.      His underlying medical conditions include hemodialysis dependent end-stage renal disease, aneurysm of the abdominal aorta, atrial fibrillation, coronary artery disease, depression, hypertension, peripheral arterial occlusive disease, type 2 diabetes mellitus, and vascular dementia.      During the hospital course, there was no evidence for acute bleed or stroke but there was encephalomalacia consistent with past stroke.  This was discovered in the workup of new onset seizure.  He was loaded with antiseizure medication.  His blood pressure dropped and adjustments of blood pressure medications were done as a result of this.  In the discharge summary there is note that the expected blood pressure medication will need to be adjusted again as he gets more active.    Upon current review of systems it is noted that his blood pressures have been higher this past week.  In talking with his son he has mentioned that it has been higher at dialysis as well.  He did have a angioplasty to his fistula and it is  functioning properly and is adequate for dialysis now.  His neuropathy pain has been worsening again into his legs.  In regards to depression symptoms, that is much better.  He does not have fevers.  He has no chills.  No sore throat nor cough or shortness of breath or chest pain.  He does not have abdominal pain or nausea.    Past Medical History:   Diagnosis Date     Anaclitic depression      Anemia      Aneurysm of abdominal aorta (H)      Atherosclerosis of aorta (H)      Atherosclerosis of coronary artery      Atrial fibrillation (H)      Atrial flutter (H)      CKD (chronic kidney disease)      Coronary artery disease due to calcified coronary lesion 1/12/2017    Severe RCA and LCX disease; see report of 12 Jan 17 by Dr. Julian Tom      Diabetes mellitus (H)      Dyslipidemia, goal LDL below 70      End-stage renal disease on hemodialysis (H)     hemodialysis 3 times a week     ESRD (end stage renal disease) (H)      Essential hypertension      History of stroke 1976     Lactose intolerance      Peripheral arterial occlusive disease (H)      Peripheral artery disease (H)     axillary and subclavian disease     Prostate cancer (H) 2004     Type 2 diabetes mellitus (H)      Typical atrial flutter (H) 10/18/2016     Unstable angina pectoris (H)      Vascular dementia              Family History   Problem Relation Age of Onset     Stroke Mother 62     cerebral hemorrhage     Cancer Father      No Medical Problems Son      No Medical Problems Son      Social History     Social History     Marital status:      Spouse name: N/A     Number of children: 2     Years of education: N/A     Occupational History      Retired     Social History Main Topics     Smoking status: Former Smoker     Types: Cigarettes     Start date: 1970     Quit date: 5/17/2005     Smokeless tobacco: Never Used     Alcohol use No     Drug use: No      Comment: formerly used     Sexual activity: Not Currently     Other Topics  Concern     Not on file     Social History Narrative         Review of Systems    .  Vitals:    07/26/18 0935   BP: 167/59   Pulse: 69   Resp: 20   Temp: 98.2  F (36.8  C)   Weight: 161 lb 3.2 oz (73.1 kg)       Physical Exam   Constitutional: No distress.   Eyes: Right eye exhibits no discharge. Left eye exhibits no discharge.   Neck: No JVD present.   Cardiovascular: Normal heart sounds.    Pulmonary/Chest: Breath sounds normal. No respiratory distress.   Abdominal: Soft. Bowel sounds are normal. He exhibits no distension. There is no tenderness.   Musculoskeletal: He exhibits no edema.   Neurological: He is alert.   Skin:   The skin of the heel is healed.  New skin is present.   Psychiatric: He has a normal mood and affect.   Nursing note and vitals reviewed.        LABS:   INR 1.0    ASSESSMENT:      ICD-10-CM    1. Seizure (H) R56.9    2. Neuropathy (H) G62.9    3. Essential hypertension I10    4. Situational depression F43.21        PLAN:    I discussed the plans with his son over the phone.  Minoxidil will be added at 2.5 mg daily.  Gabapentin will be increased to 200 mg daily.  Continue current plans with therapy.  Continue to monitor medical conditions.  Coumadin 5 mg daily and recheck INR on Monday, 7/3.    Total 25 minutes of which 80 % was spent counseling and coordination of care of the above plan.    Electronically signed by: Tone Kamara MD

## 2021-06-19 NOTE — PROGRESS NOTES
[facility]    Facility:   [facility] [[id number]]   Code Status: POLST AVAILABLE      CHIEF COMPLAINT/REASON FOR VISIT:  Chief Complaint   Patient presents with     INR Check       HISTORY:      HPI: Manuel is a 78 y.o. male presenting to [facility] for rehabilitation s/p falls, ESRD with AMS and Metabolic Encephalopathy. In addition he was found to have the following secondary problems: new onset seizure, acute on chronic anemia, h. Pylori, chest pain, and paroxysmal atrial fibrillation. He was admitted to [facility] on 6/17/2018 and discharged to the TCU on 6/27/2018. Patient had a significant course complicated by a seizure in the ED and GI hemorrhage from duodenal ulcer and left AV fistula malfunction- both of which have resolved.     Today he is being evaluated for anticoagulation management. He is being anticoagulated for atrial fibrillation. He has a goal of 2-3. His INR has been labile while in the TCU. Today his INR was 2.3, after being held for several days. He denies any uncontrolled bleeding, any active bleeding, any bruising, hematuria, epistaxis, blood in stools or black/dark/tarry stools. No nursing concerns noted. He denies any other concerns including fevers/chills, cough or cold symptoms, headaches, vision changes, chest pain/pressure, difficulty breathing, SOB, abdominal pain, nausea, vomiting, diarrhea, dysuria, increasing weakness, increasing pain.     Past Medical History:   Diagnosis Date     Anaclitic depression      Anemia      Aneurysm of abdominal aorta (H)      Atherosclerosis of aorta (H)      Atherosclerosis of coronary artery      Atrial fibrillation (H)      Atrial flutter (H)      CKD (chronic kidney disease)      Coronary artery disease due to calcified coronary lesion 1/12/2017    Severe RCA and LCX disease; see report of 12 Jan 17 by Dr. Julian Tom      Diabetes mellitus (H)      Dyslipidemia, goal LDL below 70       End-stage renal disease on hemodialysis (H)     hemodialysis 3 times a week     ESRD (end stage renal disease) (H)      Essential hypertension      History of stroke 1976     Lactose intolerance      Peripheral arterial occlusive disease (H)      Peripheral artery disease (H)     axillary and subclavian disease     Prostate cancer (H) 2004     Type 2 diabetes mellitus (H)      Typical atrial flutter (H) 10/18/2016     Unstable angina pectoris (H)      Vascular dementia              Family History   Problem Relation Age of Onset     Stroke Mother 62     cerebral hemorrhage     Cancer Father      No Medical Problems Son      No Medical Problems Son      Social History     Social History     Marital status:      Spouse name: N/A     Number of children: 2     Years of education: N/A     Occupational History      Retired     Social History Main Topics     Smoking status: Former Smoker     Types: Cigarettes     Start date: 1970     Quit date: 5/17/2005     Smokeless tobacco: Never Used     Alcohol use No     Drug use: No      Comment: formerly used     Sexual activity: Not Currently     Other Topics Concern     Not on file     Social History Narrative       REVIEW OF SYSTEM:  Pertinent items are noted in HPI.    PHYSICAL EXAM:   /56  Pulse 74  Temp 98.2  F (36.8  C)  Resp 18  Wt 157 lb (71.2 kg)  SpO2 97%  BMI 24.59 kg/m2  General appearance: alert, appears stated age and cooperative  Head: Normocephalic, without obvious abnormality, atraumatic  Lungs: respirations without effort  Extremities: extremities normal, atraumatic, no cyanosis or edema, AV fistula in LUE-thrill palpated  Pulses: 2+ and symmetric  Skin: Skin color, texture, turgor normal. No rashes or lesions  Neurologic: Grossly normal      LABS:   None today, INR scheduled.     ASSESSMENT:      ICD-10-CM    1. Anticoagulation management encounter Z51.81     Z79.01        PLAN:    Anticoagulation Management  -Coumadin 4 mg PO daily.  -Recheck  INR on Saturday and call writer's personal cell phone for orders.   -Follow up with any concerns, monitor for bleeding and report to provider team immediately.    Otherwise continue current care plan for all other chronic medical conditions, as they are stable. Encouraged patient to engage in healthy lifestyle behaviors such as engaging in social activities, exercising (PT/OT), eating well, and following care plan. Follow up for routine check-up, or sooner if needed. Will continue to monitor patient and work with nursing staff collaboratively to work toward positive patient outcomes.    Greater than 15 minutes spent with patient with at least 55% of this time spent on review of previous records, counseling, education, and discussion of the above care plan with nursing staff, and patient.     Electronically signed by: Katie Victor CNP

## 2021-06-19 NOTE — PROGRESS NOTES
Discussion/Summary   Your urine culture results were negative.         Verified Results  URINE CULTURE 04Oct2018 12:01AM TRAVON GOLDBERG     Test Name Result Flag Reference   URINE CULTURE  O    SPECIMEN DESCRIPTION (SDES): URINE, CLEAN CATCH/MIDSTREAM  CULTURE (CULT):        NO GROWTH.  REPORT STATUS (RPT):     FINAL 10/05/2018        [facility]    Facility:   [facility] [[id number]]   Code Status: POLST AVAILABLE      CHIEF COMPLAINT/REASON FOR VISIT:  Chief Complaint   Patient presents with     Problem Visit     Anticoagulation Management for Upcoming Procedure       HISTORY:      HPI: Manuel is a 78 y.o. male presenting to [facility]CU for rehabilitation s/p falls, ESRD with AMS and Metabolic Encephalopathy. In addition he was found to have the following secondary problems: new onset seizure, acute on chronic anemia, h. Pylori, chest pain, and paroxysmal atrial fibrillation. He was admitted to [facility] on 6/17/2018 and discharged to the TCU on 6/27/2018. Patient had a significant course complicated by a seizure in the ED and GI hemorrhage from duodenal ulcer and left AV fistula malfunction- both of which have resolved.     Today he is being evaluated for anticoagulation management in relation to an upcoming procedure. Mr. Marrero is having a fistulogram and physician providing procedure would like to have writer manage anticoagulation. Per patient's history, his CHADVASC score was 7 and therefore he should be anticoagulated with a bridging therapy. Mr. Marrero does have ESRD with dialysis and therefore is not a great candidate for lovenox. He denies any other concerns including fevers/chills, cough or cold symptoms, headaches, vision changes, chest pain/pressure, difficulty breathing, SOB, abdominal pain, nausea, vomiting, diarrhea, dysuria, increasing weakness, increasing pain.     Past Medical History:   Diagnosis Date     Anaclitic depression      Anemia      Aneurysm of abdominal aorta (H)      Atherosclerosis of aorta (H)      Atherosclerosis of coronary artery      Atrial fibrillation (H)      Atrial flutter (H)      CKD (chronic kidney disease)      Coronary artery disease due to calcified coronary lesion 1/12/2017    Severe RCA and LCX disease; see report of 12 Jan 17 by Dr. Jordan      Depression      Diabetes mellitus (H)      Dyslipidemia, goal LDL below 70      End-stage renal disease on hemodialysis (H)     hemodialysis 3 times a week     ESRD (end stage renal disease) (H)      Essential hypertension      History of stroke 1976     Lactose intolerance      Peripheral arterial occlusive disease (H)      Peripheral artery disease (H)     axillary and subclavian disease     Prostate cancer (H) 2004     Type 2 diabetes mellitus (H)      Typical atrial flutter (H) 10/18/2016     Unstable angina pectoris (H)      Vascular dementia              Family History   Problem Relation Age of Onset     Stroke Mother 62     cerebral hemorrhage     Cancer Father      No Medical Problems Son      No Medical Problems Son      Social History     Social History     Marital status:      Spouse name: N/A     Number of children: 2     Years of education: N/A     Occupational History      Retired     Social History Main Topics     Smoking status: Former Smoker     Types: Cigarettes     Start date: 1970     Quit date: 5/17/2005     Smokeless tobacco: Never Used     Alcohol use No     Drug use: No      Comment: formerly used     Sexual activity: Not Currently     Other Topics Concern     Not on file     Social History Narrative       REVIEW OF SYSTEM:  Pertinent items are noted in HPI.    PHYSICAL EXAM:   BP (!) 181/64  Pulse 63  Temp 98.1  F (36.7  C)  Resp 18  Wt 157 lb 9.6 oz (71.5 kg)  SpO2 99%  BMI 24.68 kg/m2  General appearance: alert, appears stated age and cooperative  Head: Normocephalic, without obvious abnormality, atraumatic  Lungs: respirations without effort, LS CTA  CVS: S1, S2, no murmur/rub/gallop/click appreciated  Abdomen: soft, nontender, BSx4  Extremities: extremities normal, atraumatic, no cyanosis or edema, AV fistula in LUE-thrill palpated  Pulses: 2+ and symmetric  Skin: Skin color, texture, turgor normal. No rashes or lesions  Neurologic: Grossly normal      LABS:   None today,  INR scheduled.     ASSESSMENT:      ICD-10-CM    1. Anticoagulation management encounter Z51.81     Z79.01        PLAN:    Anticoagulation Management for Procedure: Fistulogram  -CHADS-Vasc score of 7, therefore bridging therapy should be instituted.   -Continue previous Coumadin orders but discontinue on 7/19/2018.   -On 7/21/2018 start Heparin 5000 units subcutaneously two times a day.  -Hold Heparin on evening of 7/23/2018 and through day of procedure.   -Restart Heparin on 7/25/2018, resume Coumadin at that time as well.   -INR on 7/24/2018 and report to surgeon, as well as writer.   -INR daily thereafter.   -Plan to discontinue Heparin when Coumadin is again therapeutic.   -Follow carefully and monitor for bleeding. Report any issues to provider team immediately.     Otherwise continue current care plan for all other chronic medical conditions, as they are stable. Encouraged patient to engage in healthy lifestyle behaviors such as engaging in social activities, exercising (PT/OT), eating well, and following care plan. Follow up for routine check-up, or sooner if needed. Will continue to monitor patient and work with nursing staff collaboratively to work toward positive patient outcomes.    Greater than 35 minutes spent with patient with at least 55% of this time spent on review of previous records, counseling, education, and discussion of the above care plan with nursing staff and patient.     Electronically signed by: Katie Victor CNP

## 2021-06-19 NOTE — PROGRESS NOTES
[facility]    Facility:   [facility] [[id number]]   Code Status: POLST AVAILABLE      CHIEF COMPLAINT/REASON FOR VISIT:  Chief Complaint   Patient presents with     Review Of Multiple Medical Conditions     Physical Deconditioning, ESRD, Falls       HISTORY:      HPI: Manuel is a 78 y.o. male presenting to [facility] TCU for rehabilitation s/p falls, ESRD with AMS and Metabolic Encephalopathy. In addition he was found to have the following secondary problems: new onset seizure, acute on chronic anemia, h. Pylori, chest pain, and paroxysmal atrial fibrillation. He was admitted to  on 6/17/2018 and discharged to the TCU on 6/27/2018. Patient had a significant course complicated by a seizure in the ED and GI hemorrhage from duodenal ulcer and left AV fistula malfunction- both of which have resolved.     Today he is being evaluated for an intake examination to the TCU and a review of multiple medical problems. He reports he is doing quite well since admission to the TCU. He does not have any complaints and is doing quite well. PT/OT has been going very good and patient is feeling stronger. He has been going to dialysis and has been followed for anticoagulation. He denies any other concerns including fevers/chills, cough or cold symptoms, headaches, vision changes, chest pain/pressure, difficulty breathing, SOB, abdominal pain, nausea, vomiting, diarrhea, dysuria, increasing weakness, increasing pain.     Past Medical History:   Diagnosis Date     Anaclitic depression      Anemia      Aneurysm of abdominal aorta (H)      Atherosclerosis of aorta (H)      Atherosclerosis of coronary artery      Atrial fibrillation (H)      Atrial flutter (H)      CKD (chronic kidney disease)      Coronary artery disease due to calcified coronary lesion 1/12/2017    Severe RCA and LCX disease; see report of 12 Jan 17 by Dr. Julian Tom      Diabetes mellitus (H)       Dyslipidemia, goal LDL below 70      End-stage renal disease on hemodialysis (H)     hemodialysis 3 times a week     ESRD (end stage renal disease) (H)      Essential hypertension      History of stroke 1976     Lactose intolerance      Peripheral arterial occlusive disease (H)      Peripheral artery disease (H)     axillary and subclavian disease     Prostate cancer (H) 2004     Type 2 diabetes mellitus (H)      Typical atrial flutter (H) 10/18/2016     Unstable angina pectoris (H)      Vascular dementia              Family History   Problem Relation Age of Onset     Stroke Mother 62     cerebral hemorrhage     Cancer Father      No Medical Problems Son      No Medical Problems Son      Social History     Social History     Marital status:      Spouse name: N/A     Number of children: 2     Years of education: N/A     Occupational History      Retired     Social History Main Topics     Smoking status: Former Smoker     Types: Cigarettes     Start date: 1970     Quit date: 5/17/2005     Smokeless tobacco: Never Used     Alcohol use No     Drug use: No      Comment: formerly used     Sexual activity: Not Currently     Other Topics Concern     Not on file     Social History Narrative       REVIEW OF SYSTEM:  Pertinent items are noted in HPI.    PHYSICAL EXAM:   /64  Pulse 75  Temp 97  F (36.1  C)  Resp 16  Wt 163 lb (73.9 kg)  SpO2 99%  BMI 25.53 kg/m2  General appearance: alert, appears stated age and cooperative  Head: Normocephalic, without obvious abnormality, atraumatic  Lungs: clear to auscultation bilaterally  Heart: regular rate and rhythm, S1, S2 normal, no murmur, click, rub or gallop  Abdomen: soft, non-tender; bowel sounds normal; no masses,  no organomegaly  Extremities: extremities normal, atraumatic, no cyanosis or edema, AV fistula in LUE-thrill palpated  Pulses: 2+ and symmetric  Skin: Skin color, texture, turgor normal. No rashes or lesions  Neurologic: Grossly normal      LABS:    None today, INR scheduled.     ASSESSMENT:      ICD-10-CM    1. ESRD (end stage renal disease) on dialysis (H) N18.6     Z99.2    2. Physical deconditioning R53.81    3. Fall, initial encounter W19.XXXA        PLAN:    Physical Deconditioning due to ESRD and Falls with multiple comorbidities  -Continue PT/OT and other therapies as per care plan.  -Encouraged good nutrition and movement habits.   -Discussed care plan and expected course of stay.   -Continue to follow-up per routine schedule or sooner if needed.     Otherwise continue current care plan for all other chronic medical conditions, as they are stable. Encouraged patient to engage in healthy lifestyle behaviors such as engaging in social activities, exercising (PT/OT), eating well, and following care plan. Follow up for routine check-up, or sooner if needed. Will continue to monitor patient and work with nursing staff collaboratively to work toward positive patient outcomes.    Greater than 25 minutes spent with patient with at least 55% of this time spent on review of previous records, counseling, education, and discussion of the above care plan with nursing staff, and patient.     Electronically signed by: Katie Victor CNP

## 2021-06-19 NOTE — PROGRESS NOTES
[facility]    Facility:   [facility] [[id number]]   Code Status: POLST AVAILABLE      CHIEF COMPLAINT/REASON FOR VISIT:  Chief Complaint   Patient presents with     Review Of Multiple Medical Conditions     physical deconditioning, falls       HISTORY:      HPI: Manuel is a 78 y.o. male presenting to [facility] TCU for rehabilitation s/p falls, ESRD with AMS and Metabolic Encephalopathy. In addition he was found to have the following secondary problems: new onset seizure, acute on chronic anemia, h. Pylori, chest pain, and paroxysmal atrial fibrillation. He was admitted to [facility] on 6/17/2018 and discharged to the TCU on 6/27/2018. Patient had a significant course complicated by a seizure in the ED and GI hemorrhage from duodenal ulcer and left AV fistula malfunction- both of which have resolved.     Today he is being evaluated for anticoagulation management in addition to a review of multiple medical problems while in the TCU. He is being anticoagulated for atrial fibrillation. He has a goal of 2-3. His INR has been labile while in the TCU. Today his INR was 4.9. He denies any uncontrolled bleeding, any active bleeding, any bruising, hematuria, epistaxis, blood in stools or black/dark/tarry stools. No nursing concerns noted. He otherwise states things are going very well. He is very happy with his progress and feels he is getting stronger every day. He is eating and drinking well. Interacting well with nursing staff. He denies any other concerns including fevers/chills, cough or cold symptoms, headaches, vision changes, chest pain/pressure, difficulty breathing, SOB, abdominal pain, nausea, vomiting, diarrhea, dysuria, increasing weakness, increasing pain.     Past Medical History:   Diagnosis Date     Anaclitic depression      Anemia      Aneurysm of abdominal aorta (H)      Atherosclerosis of aorta (H)      Atherosclerosis of coronary artery      Atrial  fibrillation (H)      Atrial flutter (H)      CKD (chronic kidney disease)      Coronary artery disease due to calcified coronary lesion 1/12/2017    Severe RCA and LCX disease; see report of 12 Jan 17 by Dr. Julian Tom      Diabetes mellitus (H)      Dyslipidemia, goal LDL below 70      End-stage renal disease on hemodialysis (H)     hemodialysis 3 times a week     ESRD (end stage renal disease) (H)      Essential hypertension      History of stroke 1976     Lactose intolerance      Peripheral arterial occlusive disease (H)      Peripheral artery disease (H)     axillary and subclavian disease     Prostate cancer (H) 2004     Type 2 diabetes mellitus (H)      Typical atrial flutter (H) 10/18/2016     Unstable angina pectoris (H)      Vascular dementia              Family History   Problem Relation Age of Onset     Stroke Mother 62     cerebral hemorrhage     Cancer Father      No Medical Problems Son      No Medical Problems Son      Social History     Social History     Marital status:      Spouse name: N/A     Number of children: 2     Years of education: N/A     Occupational History      Retired     Social History Main Topics     Smoking status: Former Smoker     Types: Cigarettes     Start date: 1970     Quit date: 5/17/2005     Smokeless tobacco: Never Used     Alcohol use No     Drug use: No      Comment: formerly used     Sexual activity: Not Currently     Other Topics Concern     Not on file     Social History Narrative       REVIEW OF SYSTEM:  Pertinent items are noted in HPI.    PHYSICAL EXAM:   /59  Pulse 75  Temp 97.8  F (36.6  C)  Resp 18  Wt 155 lb 9.6 oz (70.6 kg)  SpO2 99%  BMI 24.37 kg/m2  General appearance: alert, appears stated age and cooperative  Head: Normocephalic, without obvious abnormality, atraumatic  Lungs: respirations without effort, LS CTA  CVS: S1, S2, no murmur/rub/gallop/click appreciated  Abdomen: soft, nontender, BSx4  Extremities: extremities  normal, atraumatic, no cyanosis or edema, AV fistula in LUE-thrill palpated  Pulses: 2+ and symmetric  Skin: Skin color, texture, turgor normal. No rashes or lesions  Neurologic: Grossly normal      LABS:   None today, INR scheduled.     ASSESSMENT:      ICD-10-CM    1. Physical deconditioning R53.81    2. Fall, subsequent encounter W19.XXXD    3. Anticoagulation management encounter Z51.81     Z79.01        PLAN:    Physical Deconditioning  -Continue PT/OT and other therapies as per care plan.  -Encouraged good nutrition and movement habits.   -Discussed care plan and expected course of stay.   -Pain improved.  -Continue to follow-up per routine schedule or sooner if needed.     Depression  -Mood appears to be improved.   -No concerns at this time.    Anticoagulation Management  -INR 4.9.   -Hold Coumadin for 2 days.  -Recheck INR on Thursday.  -Follow up with any concerns, monitor for bleeding and report to provider team immediately.    Otherwise continue current care plan for all other chronic medical conditions, as they are stable. Encouraged patient to engage in healthy lifestyle behaviors such as engaging in social activities, exercising (PT/OT), eating well, and following care plan. Follow up for routine check-up, or sooner if needed. Will continue to monitor patient and work with nursing staff collaboratively to work toward positive patient outcomes.    Greater than 25 minutes spent with patient with at least 55% of this time spent on review of previous records, counseling, education, and discussion of the above care plan with nursing staff, and patient.     Electronically signed by: Katie Victor CNP

## 2021-06-19 NOTE — PROGRESS NOTES
[facility]    Facility:   [facility] [[id number]]   Code Status: FULL CODE      CHIEF COMPLAINT/REASON FOR VISIT:  Chief Complaint   Patient presents with     Review Of Multiple Medical Conditions       HISTORY:      HPI: Manuel is a 78 y.o. male who has a complex medical history and presented to the hospital after falling.  The fall was not witnessed, but his son lives with him about his father on the floor after he heard him banging on the door for help.  Apparently his dad had some chest pain, shortness of breath, and palpitations prior to falling.  There was no associated dizziness or lightheadedness.  His son had noticed that on Friday, 6/15/18 that he started to complain of some vague chest pain and bilateral leg cramping and that he just did not look well and was walking more gingerly and struggling to keep his balance.  This was after he had undergone his dialysis for the day.     His underlying medical conditions include hemodialysis dependent end-stage renal disease, aneurysm of the abdominal aorta, atrial fibrillation, coronary artery disease, depression, hypertension, peripheral arterial occlusive disease, type 2 diabetes mellitus, and vascular dementia.     During the hospital course, there was no evidence for acute bleed or stroke but there was encephalomalacia consistent with past stroke.  This was discovered in the workup of new onset seizure.  He was loaded with antiseizure medication.  His blood pressure dropped and adjustments of blood pressure medications were done as a result of this.  In the discharge summary there is note that the expected blood pressure medication will need to be adjusted again as he gets more active.    Upon current review of systems, his health is doing better in general.  Specifically he is not having fevers or chills nor cough or shortness of breath or chest pain or abdominal pain or nausea.  His blood pressures have been 136/59, 154/71,  165/58, 133/55, 152/56, 120/63, and 111/57 for recent readings.    Past Medical History:   Diagnosis Date     Anaclitic depression      Anemia      Aneurysm of abdominal aorta (H)      Atherosclerosis of aorta (H)      Atherosclerosis of coronary artery      Atrial fibrillation (H)      Atrial flutter (H)      CKD (chronic kidney disease)      Coronary artery disease due to calcified coronary lesion 1/12/2017    Severe RCA and LCX disease; see report of 12 Jan 17 by Dr. Julian Tom      Diabetes mellitus (H)      Dyslipidemia, goal LDL below 70      End-stage renal disease on hemodialysis (H)     hemodialysis 3 times a week     ESRD (end stage renal disease) (H)      Essential hypertension      History of stroke 1976     Lactose intolerance      Peripheral arterial occlusive disease (H)      Peripheral artery disease (H)     axillary and subclavian disease     Prostate cancer (H) 2004     Type 2 diabetes mellitus (H)      Typical atrial flutter (H) 10/18/2016     Unstable angina pectoris (H)      Vascular dementia              Family History   Problem Relation Age of Onset     Stroke Mother 62     cerebral hemorrhage     Cancer Father      No Medical Problems Son      No Medical Problems Son      Social History     Social History     Marital status:      Spouse name: N/A     Number of children: 2     Years of education: N/A     Occupational History      Retired     Social History Main Topics     Smoking status: Former Smoker     Types: Cigarettes     Start date: 1970     Quit date: 5/17/2005     Smokeless tobacco: Never Used     Alcohol use No     Drug use: No      Comment: formerly used     Sexual activity: Not Currently     Other Topics Concern     Not on file     Social History Narrative         Review of Systems    .  Vitals:    07/19/18 1605   BP: 173/58   Pulse: 68   Resp: 17   Temp: 97.8  F (36.6  C)   SpO2: 99%       Physical Exam   Constitutional: No distress.   Eyes: Right eye exhibits  no discharge. Left eye exhibits no discharge.   Neck: No JVD present.   Cardiovascular: Normal heart sounds.    Pulmonary/Chest: Breath sounds normal. No respiratory distress.   Abdominal: There is no tenderness.   Neurological: He is alert.   Psychiatric: He has a normal mood and affect.   Nursing note and vitals reviewed.        LABS:   No new laboratory testing    ASSESSMENT:      ICD-10-CM    1. Seizure (H) R56.9    2. Neuropathy (H) G62.9    3. Essential hypertension I10    4. Fall, subsequent encounter W19.XXXD    5. Situational depression F43.21        PLAN:    Continue the current plan of rehabilitation and monitoring of his medical conditions.  Although he has labile hypertension, is in a range that is reasonable at this point and so no further adjustment of blood pressure medicine at this point.      Electronically signed by: Tone Kamara MD

## 2021-06-19 NOTE — PROGRESS NOTES
[facility]    Facility:   [facility] [[id number]]   Code Status: POLST AVAILABLE  PCP: Cuco Bennett MD   Phone: [phone]   Fax: [phone]      CHIEF COMPLAINT/REASON FOR VISIT:  Chief Complaint   Patient presents with     Discharge Summary     physical deconditioning, falls, ESRD       HISTORY COURSE:  Manuel is a 78 y.o. male presenting to [facility] TCU for rehabilitation s/p falls, ESRD with AMS and Metabolic Encephalopathy. In addition he was found to have the following secondary problems: new onset seizure, acute on chronic anemia, h. Pylori, chest pain, and paroxysmal atrial fibrillation. He was admitted to [facility] on 6/17/2018 and discharged to the TCU on 6/27/2018. Patient had a significant course complicated by a seizure in the ED and GI hemorrhage from duodenal ulcer and left AV fistula malfunction- both of which have resolved.     Today he is being evaluated for a discharge from the TCU. He is in a rush to get to dialysis as his ride is here--he has been examined multiple times in the recent future with the last visit on 8/1/2018. There are no new concerns since that visit. PT/OT and nursing staff do not have any concerns with him going home. He denies any other concerns including fevers/chills, cough or cold symptoms, headaches, vision changes, chest pain/pressure, difficulty breathing, SOB, abdominal pain, nausea, vomiting, diarrhea, dysuria, increasing weakness, increasing pain    PHYSICAL EXAM:   /56  Pulse 61  Temp (!) 96.5  F (35.8  C)  Resp 16  Wt 162 lb 3.2 oz (73.6 kg)  SpO2 100%  BMI 23.95 kg/m2   Patient only briefly evaluated due to necessity of transport to dialysis  General appearance: alert, appears stated age and cooperative  Head: Normocephalic, without obvious abnormality, atraumatic  Lungs: respirations without effort  Skin: Skin color, texture, turgor normal. No rashes or lesions  Neurologic: Grossly  normal    PAST MEDICAL HISTORY:  Past Medical History:   Diagnosis Date     Anaclitic depression      Anemia      Aneurysm of abdominal aorta (H)      Atherosclerosis of aorta (H)      Atherosclerosis of coronary artery      Atrial fibrillation (H)      Atrial flutter (H)      CKD (chronic kidney disease)      Coronary artery disease due to calcified coronary lesion 1/12/2017    Severe RCA and LCX disease; see report of 12 Jan 17 by Dr. Julian Tom      Diabetes mellitus (H)      Dyslipidemia, goal LDL below 70      End-stage renal disease on hemodialysis (H)     hemodialysis 3 times a week     ESRD (end stage renal disease) (H)      Essential hypertension      History of stroke 1976     Lactose intolerance      Peripheral arterial occlusive disease (H)      Peripheral artery disease (H)     axillary and subclavian disease     Prostate cancer (H) 2004     Type 2 diabetes mellitus (H)      Typical atrial flutter (H) 10/18/2016     Unstable angina pectoris (H)      Vascular dementia      MEDICATION LIST:  Current Outpatient Prescriptions   Medication Sig     acetaminophen (TYLENOL) 500 MG tablet Take 500 mg by mouth every 6 (six) hours as needed for pain.     amoxicillin (AMOXIL) 500 MG tablet Take 500 mg by mouth daily. amoxicillin  tablet; 500 mg; oral  Special Instructions: Scheduled at this time due to Dr. kelley given after dialysis  on MWF.  Once A Day;     aspirin 81 mg chewable tablet Chew 81 mg daily.     atorvastatin (LIPITOR) 80 MG tablet Take 80 mg by mouth daily.     calcium acetate (PHOSLYRA) 667 mg (169 mg calcium)/5 mL Soln solution Take 1,334 mg by mouth 3 (three) times a day.     cinacalcet (SENSIPAR) 30 MG tablet Take 30 mg by mouth daily.     clarithromycin (BIAXIN) 250 MG tablet Take 250 mg by mouth daily. clarithromycin  tablet; 250 mg; oral  Special Instructions: Scheduled at 4pm due to Dr. kelley him to have after  dialysis on MWF  Once A Day;     cloNIDine HCl (CATAPRES) 0.1 MG  tablet Take 0.1 mg by mouth at bedtime.     FLUoxetine (PROZAC) 20 MG capsule Take 20 mg by mouth daily.     gabapentin (NEURONTIN) 100 MG capsule Take 200 mg by mouth daily.     heparin sodium,porcine (HEPARIN, PORCINE,) 5,000 unit/mL injection Infuse 5,000 Units into a venous catheter 2 (two) times a day.     isosorbide mononitrate (IMDUR) 30 MG 24 hr tablet Take 30 mg by mouth daily.     levETIRAcetam (KEPPRA) 500 MG tablet Take 500 mg by mouth every evening.     losartan (COZAAR) 100 MG tablet Take 100 mg by mouth at bedtime.     metoprolol tartrate (LOPRESSOR) 100 MG tablet Take 100 mg by mouth 2 (two) times a day.     minoxidil (LONITEN) 2.5 MG tablet Take 2.5 mg by mouth daily.     niacin (NIASPAN) 500 MG CR tablet Take 500 mg by mouth at bedtime.     omeprazole (PRILOSEC OTC) 20 MG tablet Take 20 mg by mouth 2 (two) times a day.     psyllium (METAMUCIL) 0.52 gram capsule Take 0.52 g by mouth daily.     warfarin sodium (WARFARIN ORAL) Take by mouth. 7/26/18 INR 1.0  Take 5mg on 7/26.  Continue Heparin 5000 units two times a day.  Next INR 7/27/18.       DISCHARGE DIAGNOSIS:    ICD-10-CM    1. Physical deconditioning R53.81    2. ESRD (end stage renal disease) on dialysis (H) N18.6     Z99.2    3. Fall, subsequent encounter W19.XXXD        MEDICAL EQUIPMENT NEEDS:  Wheelchair    The patient has mobility limitation significantly impairing his/her ability to participate in mobility-related activities of daily living, such as toileting, feeding, dressing, grooming, and bathing in customary locations in the home. The mobility limitation cannot be sufficiently and safely resolved by use of an appropriately fitted cane or walker. The patient or caregiver is able to safely use a manual wheelchair. The patient's functional mobility deficit can be sufficiently resolved by the use of a manual wheelchair.     The patient has mobility limitations including ESRD, ankle pain, A fib, CAD, and falls that impairs them to do  activities for daily living without use of a wheelchair to be mobile in the home.    DISCHARGE PLAN/FACE TO FACE:  I certify that services are/were furnished while this patient was under the care of a physician and that a physician or an allowed non-physician practitioner (NPP), had a face-to-face encounter that meets the physician face-to-face encounter requirements. The encounter was in whole, or in part, related to the primary reason for home health. The patient is confined to his/her home and needs intermittent skilled nursing, physical therapy, speech-language pathology, or the continued need for occupational therapy. A plan of care has been established by a physician and is periodically reviewed by a physician.  Date of Face-to-Face Encounter: 8/3/2018    I certify that, based on my findings, the following services are medically necessary home health services home health aid for bathing and ADLs, skilled nursing (RN) for medication set-up and teaching, symptoms and disease process monitoring and education, PT for strengthening, balance, endurance and safety within the home, OT for strengthening, ADL needs, adaptive equipment and safety.    My clinical findings support the need for the above skilled services because home health aid for bathing and ADLs, skilled nursing (RN) for medication set-up and teaching, symptoms and disease process monitoring and education, PT for strengthening, balance, endurance and safety within the home, OT for strengthening, ADL needs, adaptive equipment and safety.    This patient is homebound because he is an elderly frail male with recent hospitalization and inpatient rehabilitation stay with several comorbidities that make it difficult and unsafe for him to go out into the community for services.    The patient is, or has been, under my care and I have initiated the establishment of the plan of care. This patient will be followed by a physician who will periodically review the  plan of care. Schedule follow up visit with primary care provider within 7 days to reestablish care.    Anticoagulation Management  -Continue 7 mg by mouth daily. Recheck tomorrow before leaving facility.   -Follow up with home health company and with PCP.     Otherwise continue current care plan for all other chronic medical conditions, as they are stable. Encouraged patient to engage in healthy lifestyle behaviors such as engaging in social activities, exercising (PT/OT), eating well, and following care plan. Follow up for routine check-up, or sooner if needed. Will continue to monitor patient and work with nursing staff collaboratively to work toward positive patient outcomes.    Greater than 25 minutes spent with patient with at least 55% of this time spent on review of previous records, counseling, education, and discussion of the above care plan with nursing staff, and patient.     Electronically signed by: Katie Victor CNP

## 2021-06-19 NOTE — PROGRESS NOTES
[facility] Radiology Pre-Procedure Note (see H&P per HANS Victor CNP on 7/19/18)  Date/Time: 7/24/2018/8:02 AM    Requested Procedure:  RUE fistulogram   Requested Provider:  Dr. Medeiros    HPI: Manuel Marrero is a 78 y.o. male with hx of DM-II, Afib (on coumadin), HTN, hyperlipidemia, CAD and HD dependent renal failure, here for RUE fistulogram for report of prolonged bleeding time after access removal from right upper arm transposed basilic fistula. Pt dialyzes M/W/F. Last full HD run was yesterday.     Allergy to IV Contrast -Hives; patient took pre-treatment medications of oral steroids and benadryl per SPR protocol.     IMAGING:    AV Fistulogram 8/22/17  PROCEDURE:  1. AV FISTULOGRAM, PTA OF PERIPHERAL SEGMENT  2. MODERATE SEDATION  3. ULTRASOUND GUIDED VASCULAR ACCESS  IMPRESSION:   1.  Moderate stenoses within the mid venous outflow and venous anastomosis of the right upper extremity transposed brachiobasilic fistula. The stenoses were successfully with 8 mm high-pressure balloon angioplasty.     NPO status:  Since MN   Anticoagulation/Antiplatelets/Bleeding tendencies:  Coumadin (on hold x 5 days), Heparin 5000 units (last dose 7/23/18) ASA 81 mg  Antibiotics:  Amoxicillin po (last dose 7/23/18)    PAST MEDICAL HISTORY:   Past Medical History:   Diagnosis Date     Anaclitic depression      Anemia      Aneurysm of abdominal aorta (H)      Atherosclerosis of aorta (H)      Atherosclerosis of coronary artery      Atrial fibrillation (H)      Atrial flutter (H)      CKD (chronic kidney disease)      Coronary artery disease due to calcified coronary lesion 1/12/2017    Severe RCA and LCX disease; see report of 12 Jan 17 by Dr. Julian Tom      Diabetes mellitus (H)      Dyslipidemia, goal LDL below 70      End-stage renal disease on hemodialysis (H)     hemodialysis 3 times a week     ESRD (end stage renal disease) (H)      Essential hypertension      History of stroke 1976     Lactose intolerance       Peripheral arterial occlusive disease (H)      Peripheral artery disease (H)     axillary and subclavian disease     Prostate cancer (H) 2004     Type 2 diabetes mellitus (H)      Typical atrial flutter (H) 10/18/2016     Unstable angina pectoris (H)      Vascular dementia        PAST SURGICAL HISTORY:  Past Surgical History:   Procedure Laterality Date     CARDIAC CATHETERIZATION N/A 1/12/2017    Procedure: Coronary Angiogram;  Surgeon: Sheri Jordan MD;  Location: [facility] Cath Lab;  Service:      CARDIOVERSION  02/09/2017     LAPAROSCOPIC CHOLECYSTECTOMY N/A 2/11/2016    Procedure:   LAPAROSCOPIC CHOLECYSTECTOMY ;  Surgeon: Last Guevara MD;  Location: [facility] OR;  Service:      MO CATH PLMT L HRT & ARTS W/NJX & ANGIO IMG S&I Left 1/12/2017    Procedure: Left Heart Catheterization Without Left Ventriculogram;  Surgeon: Sheri Jordan MD;  Location: [facility] Cath Lab;  Service: Cardiology     PROSTATECTOMY  2004    prostate cancer       ALLERGIES:  Contrast [iohexol]; Iodinated contrast- oral and iv dye; Iodine; Lactose; and Scallops    MEDICATIONS:  Current Outpatient Prescriptions   Medication Sig Dispense Refill     acetaminophen (TYLENOL) 500 MG tablet Take 500 mg by mouth every 6 (six) hours as needed for pain.       amoxicillin (AMOXIL) 500 MG tablet Take 500 mg by mouth daily. amoxicillin  tablet; 500 mg; oral  Special Instructions: Scheduled at this time due to Dr. kelley given after dialysis  on MWF.  Once A Day;       atorvastatin (LIPITOR) 80 MG tablet Take 80 mg by mouth daily.       calcium acetate (PHOSLYRA) 667 mg (169 mg calcium)/5 mL Soln solution Take 1,334 mg by mouth 3 (three) times a day.       cinacalcet (SENSIPAR) 30 MG tablet Take 30 mg by mouth daily.       clarithromycin (BIAXIN) 250 MG tablet Take 250 mg by mouth daily. clarithromycin  tablet; 250 mg; oral  Special Instructions: Scheduled at 4pm due to Dr. kelley him to have after  dialysis on MWF  Once A Day;    "    cloNIDine HCl (CATAPRES) 0.1 MG tablet Take 0.1 mg by mouth at bedtime.       gabapentin (NEURONTIN) 100 MG capsule Take 100 mg by mouth daily.       isosorbide mononitrate (IMDUR) 30 MG 24 hr tablet Take 30 mg by mouth daily.       levETIRAcetam (KEPPRA) 500 MG tablet Take 500 mg by mouth every evening.       losartan (COZAAR) 100 MG tablet Take 100 mg by mouth at bedtime.       metoprolol tartrate (LOPRESSOR) 100 MG tablet Take 100 mg by mouth 2 (two) times a day.       niacin (NIASPAN) 500 MG CR tablet Take 500 mg by mouth at bedtime.       omeprazole (PRILOSEC OTC) 20 MG tablet Take 20 mg by mouth 2 (two) times a day.       psyllium (METAMUCIL) 0.52 gram capsule Take 0.52 g by mouth daily.       warfarin (COUMADIN) 5 MG tablet Take 5 mg by mouth See Admin Instructions. Coumadin (warfarin)  tablet; 5 mg; amt: 5 mg; oral  Once A Day on Sun, Tue, Thu; 08:00 PM  **Awaiting Verification       warfarin (COUMADIN) 7.5 MG tablet Take 7.5 mg by mouth See Admin Instructions. Coumadin (warfarin)  tablet; 7.5 mg; amt: 7.5 mg; oral  Once A Day on Mon, Wed, Fri, Sat; 08:00 PM  **Awaiting Verification       Current Facility-Administered Medications   Medication Dose Route Frequency Provider Last Rate Last Dose     lidocaine 10 mg/mL (1 %) injection 0.1-0.3 mL  0.1-0.3 mL Subcutaneous PRN Endy Nash CNP         methylPREDNISolone tablet 32 mg (MEDROL)  32 mg Oral Once Radha Choi MD         methylPREDNISolone tablet 32 mg (MEDROL)  32 mg Oral Once Radha Choi MD         sodium chloride flush 3 mL (NS)  3 mL Intravenous Line Care Endy Nash CNP         LABS:    Lab Results   Component Value Date    HGB 8.1 (L) 07/24/2018     Lab Results   Component Value Date     07/24/2018     Lab Results   Component Value Date    K 3.9 07/24/2018     EXAM:  BP (!) 196/87  Pulse 64  Temp 98.7  F (37.1  C) (Oral)   Resp 16  Ht 5' 9\" (1.753 m)  Wt 158 lb (71.7 kg)  SpO2 99%  BMI 23.33 " kg/m2  General:  Stable.  In no acute distress.  Neuro:  A&O x 3.  Moves all extremities equally.  Resp:  Lungs clear to auscultation bilaterally.  Cardio:  S1S2 and reg.   Skin:  No excoriations, ecchymosis, erythema, lesions, or open sores noted over RUE fistula.  RUE: Fistula with palpable thrill and bruit auscultated.   Motor/sensation:  No gross motor weakness.  Sensation intact.  Proprioception intact.    Pre-Sedation Assessment:  Mallampati Airway Classification: Class 2: upper half of tonsil fossa visible  Previous reaction to anesthesia/sedation: no  Sedation plan based on assessment: Moderate  Sleep Apnea: no  Dentures: no  COPD: no  ASA Classification: ASA 3 - Patient with moderate systemic disease with functional limitations  Comments: No hx of asthma     ASSESSMENT:  78 yr old male with HD dependent renal failure and report of prolonged bleeding with access removal from RUE fistula    PLAN:  RUE fistulogram with possible PTA, stent, lysis, possible placement of a tunneled dialysis catheter - with sedation.     The procedure, risks and moderate sedation were discussed with patient, all questions answered and patient agrees to proceed with the procedure. Written consent obtained.    RUE fistula site initialed with indelible ink.     Endy Nash, APRN, CNP

## 2021-06-19 NOTE — PROGRESS NOTES
NR 3.0 with home health. Dose at 7.5 mg Wed and Sat and 5 mg all other days. Retest in one week. After talking with pt and discussing history of greens/salads and medication change. Pt will  continue  with current diet and dosing of Warfarin.  Continue with moderation of Vit K and green leafy vegetables. Cautioned to call with increase bruising or bleeding. Reminded to call with medication change especially antibiotic. Call with any questions or concerns or any up coming procedures. Cautioned about using Herbal medication.

## 2021-06-19 NOTE — PROGRESS NOTES
[facility]    Facility:   [facility] [[id number]]   Code Status: POLST AVAILABLE      CHIEF COMPLAINT/REASON FOR VISIT:  Chief Complaint   Patient presents with     INR Check       HISTORY:      HPI: Manuel is a 78 y.o. male presenting to [facility] TCU for rehabilitation s/p falls, ESRD with AMS and Metabolic Encephalopathy. In addition he was found to have the following secondary problems: new onset seizure, acute on chronic anemia, h. Pylori, chest pain, and paroxysmal atrial fibrillation. He was admitted to [facility] on 6/17/2018 and discharged to the TCU on 6/27/2018. Patient had a significant course complicated by a seizure in the ED and GI hemorrhage from duodenal ulcer and left AV fistula malfunction- both of which have resolved.     Today he is being evaluated for anticoagulation management. He is being anticoagulated for atrial fibrillation. He has a goal of 2-3. His INR has been labile while in the TCU. Today his INR was 2.9, after being held for several days. He denies any uncontrolled bleeding, any active bleeding, any bruising, hematuria, epistaxis, blood in stools or black/dark/tarry stools. No nursing concerns noted. He denies any other concerns including fevers/chills, cough or cold symptoms, headaches, vision changes, chest pain/pressure, difficulty breathing, SOB, abdominal pain, nausea, vomiting, diarrhea, dysuria, increasing weakness, increasing pain.     Past Medical History:   Diagnosis Date     Anaclitic depression      Anemia      Aneurysm of abdominal aorta (H)      Atherosclerosis of aorta (H)      Atherosclerosis of coronary artery      Atrial fibrillation (H)      Atrial flutter (H)      CKD (chronic kidney disease)      Coronary artery disease due to calcified coronary lesion 1/12/2017    Severe RCA and LCX disease; see report of 12 Jan 17 by Dr. Julian Tom      Diabetes mellitus (H)      Dyslipidemia, goal LDL below 70       End-stage renal disease on hemodialysis (H)     hemodialysis 3 times a week     ESRD (end stage renal disease) (H)      Essential hypertension      History of stroke 1976     Lactose intolerance      Peripheral arterial occlusive disease (H)      Peripheral artery disease (H)     axillary and subclavian disease     Prostate cancer (H) 2004     Type 2 diabetes mellitus (H)      Typical atrial flutter (H) 10/18/2016     Unstable angina pectoris (H)      Vascular dementia              Family History   Problem Relation Age of Onset     Stroke Mother 62     cerebral hemorrhage     Cancer Father      No Medical Problems Son      No Medical Problems Son      Social History     Social History     Marital status:      Spouse name: N/A     Number of children: 2     Years of education: N/A     Occupational History      Retired     Social History Main Topics     Smoking status: Former Smoker     Types: Cigarettes     Start date: 1970     Quit date: 5/17/2005     Smokeless tobacco: Never Used     Alcohol use No     Drug use: No      Comment: formerly used     Sexual activity: Not Currently     Other Topics Concern     Not on file     Social History Narrative       REVIEW OF SYSTEM:  Pertinent items are noted in HPI.    PHYSICAL EXAM:   /58  Pulse 74  Temp 98.4  F (36.9  C)  Resp 18  Wt 154 lb 6.4 oz (70 kg)  SpO2 98%  BMI 24.18 kg/m2  General appearance: alert, appears stated age and cooperative  Head: Normocephalic, without obvious abnormality, atraumatic  Lungs: respirations without effort  Extremities: extremities normal, atraumatic, no cyanosis or edema, AV fistula in LUE-thrill palpated  Pulses: 2+ and symmetric  Skin: Skin color, texture, turgor normal. No rashes or lesions  Neurologic: Grossly normal      LABS:   None today, INR scheduled.     ASSESSMENT:      ICD-10-CM    1. Anticoagulation management encounter Z51.81     Z79.01        PLAN:    Anticoagulation Management  -INR 2.9, at goal.  -Coumadin  4 mg PO daily.  -Recheck INR on Saturday and call writer's personal cell phone for orders.   -Follow up with any concerns, monitor for bleeding and report to provider team immediately.    Otherwise continue current care plan for all other chronic medical conditions, as they are stable. Encouraged patient to engage in healthy lifestyle behaviors such as engaging in social activities, exercising (PT/OT), eating well, and following care plan. Follow up for routine check-up, or sooner if needed. Will continue to monitor patient and work with nursing staff collaboratively to work toward positive patient outcomes.    Greater than 15 minutes spent with patient with at least 55% of this time spent on review of previous records, counseling, education, and discussion of the above care plan with nursing staff, and patient.     Electronically signed by: Katie Victor CNP

## 2021-06-19 NOTE — PROGRESS NOTES
[facility]    Facility:   [facility] [[id number]]   Code Status: POLST AVAILABLE      CHIEF COMPLAINT/REASON FOR VISIT:  Chief Complaint   Patient presents with     Problem Visit     Depression        HISTORY:      HPI: Manuel is a 78 y.o. male presenting to [facility]CU for rehabilitation s/p falls, ESRD with AMS and Metabolic Encephalopathy. In addition he was found to have the following secondary problems: new onset seizure, acute on chronic anemia, h. Pylori, chest pain, and paroxysmal atrial fibrillation. He was admitted to [facility] on 6/17/2018 and discharged to the TCU on 6/27/2018. Patient had a significant course complicated by a seizure in the ED and GI hemorrhage from duodenal ulcer and left AV fistula malfunction- both of which have resolved.     Today he is being evaluated for a review of multiple medical problems with a specific focus on depression. He has been having multiple falls in the last week and apparently reported to nursing staff that he is doing this on purpose to try and hurt himself. He states he is very depressed over his current predicament. He states being sick and going to dialysis daily is very difficult on him. He wishes he was healthy. He states he was having a great deal of discomfort. He states he was just hurting and he wanted to make it stop. Yesterday, while at dialysis nursing staff did ask me to change his pain medication. Tylenol 650 mg by mouth 4 times daily was added to his regimen and he reports that this is working very well and he is feeling much better. With the pain being addressed he feels much better and far less depressed than previous. He is in agreement to start something for depression. He also agrees to start therapy and evaluation by psych. Writer spoke with patient's son Manuel Johnston and he is happy to have his father evaluated and treated for depression. Manuel Chambers shares that his dad has had multiple problems  with depression in the past and has been treated with antidepressants successfully. Manuel denies any other concerns including fevers/chills, cough or cold symptoms, headaches, vision changes, chest pain/pressure, difficulty breathing, SOB, abdominal pain, nausea, vomiting, diarrhea, dysuria, increasing weakness, increasing pain.     Past Medical History:   Diagnosis Date     Anaclitic depression      Anemia      Aneurysm of abdominal aorta (H)      Atherosclerosis of aorta (H)      Atherosclerosis of coronary artery      Atrial fibrillation (H)      Atrial flutter (H)      CKD (chronic kidney disease)      Coronary artery disease due to calcified coronary lesion 1/12/2017    Severe RCA and LCX disease; see report of 12 Jan 17 by Dr. Jordan     Depression      Diabetes mellitus (H)      Dyslipidemia, goal LDL below 70      End-stage renal disease on hemodialysis (H)     hemodialysis 3 times a week     ESRD (end stage renal disease) (H)      Essential hypertension      History of stroke 1976     Lactose intolerance      Peripheral arterial occlusive disease (H)      Peripheral artery disease (H)     axillary and subclavian disease     Prostate cancer (H) 2004     Type 2 diabetes mellitus (H)      Typical atrial flutter (H) 10/18/2016     Unstable angina pectoris (H)      Vascular dementia              Family History   Problem Relation Age of Onset     Stroke Mother 62     cerebral hemorrhage     Cancer Father      No Medical Problems Son      No Medical Problems Son      Social History     Social History     Marital status:      Spouse name: N/A     Number of children: 2     Years of education: N/A     Occupational History      Retired     Social History Main Topics     Smoking status: Former Smoker     Types: Cigarettes     Start date: 1970     Quit date: 5/17/2005     Smokeless tobacco: Never Used     Alcohol use No     Drug use: No      Comment: formerly used     Sexual activity: Not Currently     Other  Topics Concern     Not on file     Social History Narrative       REVIEW OF SYSTEM:  Pertinent items are noted in HPI.    PHYSICAL EXAM:   /58  Pulse 74  Temp 98.4  F (36.9  C)  Resp 18  Wt 154 lb 6.4 oz (70 kg)  SpO2 98%  BMI 24.18 kg/m2  General appearance: alert, appears stated age and cooperative  Head: Normocephalic, without obvious abnormality, atraumatic  Lungs: clear to auscultation bilaterally  Heart: regular rate and rhythm, S1, S2 normal, no murmur, click, rub or gallop  Abdomen: soft, non-tender; bowel sounds normal; no masses,  no organomegaly  Extremities: extremities normal, atraumatic, no cyanosis or edema, AV fistula present  Pulses: 2+ and symmetric  Skin: Skin color, texture, turgor normal. No rashes or lesions  Neurologic: Grossly normal      LABS:   None today, INR scheduled.     ASSESSMENT:      ICD-10-CM    1. Depression F32.9        PLAN:    Depression  -Fluoxetine 20 mg by mouth daily.  -Psych consult, therapy.  -In house CBT.   -Monitor for SI/HI and report immediately to provider team.   -Monitor carefully for shifts in mood and report to care team.   -Follow up this week and as needed or with any concerns.     Otherwise continue current care plan for all other chronic medical conditions, as they are stable. Encouraged patient to engage in healthy lifestyle behaviors such as engaging in social activities, exercising (PT/OT), eating well, and following care plan. Follow up for routine check-up, or sooner if needed. Will continue to monitor patient and work with nursing staff collaboratively to work toward positive patient outcomes.    Greater than 45 minutes spent with patient with at least 55% of this time spent on review of previous records, counseling, education, and discussion of the above care plan with nursing staff, and patient.     Electronically signed by: Katie Victor CNP

## 2021-06-19 NOTE — PROGRESS NOTES
[facility]    Facility:   [facility] [[id number]]   Code Status: See previous      CHIEF COMPLAINT/REASON FOR VISIT:  Chief Complaint   Patient presents with     Review Of Multiple Medical Conditions       HISTORY:      HPI: Manuel is a 78 y.o. male who has a complex medical history and presented to the hospital after falling.  The fall was not witnessed, but his son lives with him about his father on the floor after he heard him banging on the door for help.  Apparently his dad had some chest pain, shortness of breath, and palpitations prior to falling.  There was no associated dizziness or lightheadedness.  His son had noticed that on Friday, 6/15/18 that he started to complain of some vague chest pain and bilateral leg cramping and that he just did not look well and was walking more gingerly and struggling to keep his balance.  This was after he had undergone his dialysis for the day.     His underlying medical conditions include hemodialysis dependent end-stage renal disease, aneurysm of the abdominal aorta, atrial fibrillation, coronary artery disease, depression, hypertension, peripheral arterial occlusive disease, type 2 diabetes mellitus, and vascular dementia.     During the hospital course, there was no evidence for acute bleed or stroke but there was encephalomalacia consistent with past stroke.  This was discovered in the workup of new onset seizure.  He was loaded with antiseizure medication.  His blood pressure dropped and adjustments of blood pressure medications were done as a result of this.  In the discharge summary there is note that the expected blood pressure medication will need to be adjusted again as he gets more active.    Upon current review of systems, he has been experiencing significant depression in relationship to his underlying medical conditions.  He has been intentionally falling with purpose of hurting himself.  He was started on fluoxetine earlier  this week and there has not been any significant change about depression symptoms, and he states that the main medical problem facing him currently is neuropathy of his legs.  He is not experiencing new problems of shortness of breath or chest pain or palpitations nor cough nor abdominal pain or nausea nor fevers nor chills.    Past Medical History:   Diagnosis Date     Anaclitic depression      Anemia      Aneurysm of abdominal aorta (H)      Atherosclerosis of aorta (H)      Atherosclerosis of coronary artery      Atrial fibrillation (H)      Atrial flutter (H)      CKD (chronic kidney disease)      Coronary artery disease due to calcified coronary lesion 1/12/2017    Severe RCA and LCX disease; see report of 12 Jan 17 by Dr. Julian Tom      Diabetes mellitus (H)      Dyslipidemia, goal LDL below 70      End-stage renal disease on hemodialysis (H)     hemodialysis 3 times a week     ESRD (end stage renal disease) (H)      Essential hypertension      History of stroke 1976     Lactose intolerance      Peripheral arterial occlusive disease (H)      Peripheral artery disease (H)     axillary and subclavian disease     Prostate cancer (H) 2004     Type 2 diabetes mellitus (H)      Typical atrial flutter (H) 10/18/2016     Unstable angina pectoris (H)      Vascular dementia              Family History   Problem Relation Age of Onset     Stroke Mother 62     cerebral hemorrhage     Cancer Father      No Medical Problems Son      No Medical Problems Son      Social History     Social History     Marital status:      Spouse name: N/A     Number of children: 2     Years of education: N/A     Occupational History      Retired     Social History Main Topics     Smoking status: Former Smoker     Types: Cigarettes     Start date: 1970     Quit date: 5/17/2005     Smokeless tobacco: Never Used     Alcohol use No     Drug use: No      Comment: formerly used     Sexual activity: Not Currently     Other Topics  Concern     Not on file     Social History Narrative         Review of Systems    .  Vitals:    07/11/18 1358   BP: 157/58   Pulse: 74   Resp: 17   Temp: 98.4  F (36.9  C)   SpO2: 98%       Physical Exam   Constitutional: No distress.   Eyes: Right eye exhibits no discharge. Left eye exhibits no discharge.   Neck: No JVD present.   Cardiovascular: Normal heart sounds.    Pulmonary/Chest: Breath sounds normal. No respiratory distress.   Abdominal: Soft. There is no tenderness.   Musculoskeletal: He exhibits no edema.   Neurological: He is alert.   Skin: Skin is warm.   Psychiatric: He has a normal mood and affect.   Nursing note and vitals reviewed.        LABS:   No new laboratory testing    ASSESSMENT:      ICD-10-CM    1. Seizure (H) R56.9    2. Neuropathy (H) G62.9    3. Paroxysmal atrial fibrillation (H) I48.0    4. Situational depression F43.21    5. ESRD (end stage renal disease) on dialysis (H) N18.6     Z99.2    6. Fall, subsequent encounter W19.XXXD        PLAN:    Gabapentin 100 mg daily in terms of hemodialysis dosing.  I discussed this with the patient and his son.      Electronically signed by: Tone Kamara MD

## 2021-06-19 NOTE — PROGRESS NOTES
[facility]    Facility:   [facility] [[id number]]   Code Status: POLST AVAILABLE      CHIEF COMPLAINT/REASON FOR VISIT:  Chief Complaint   Patient presents with     Problem Visit     s/p fistulogram, anticoagulation management       HISTORY:      HPI: Manuel is a 78 y.o. male presenting to [facility] for rehabilitation s/p falls, ESRD with AMS and Metabolic Encephalopathy. In addition he was found to have the following secondary problems: new onset seizure, acute on chronic anemia, h. Pylori, chest pain, and paroxysmal atrial fibrillation. He was admitted to [facility] on 6/17/2018 and discharged to the TCU on 6/27/2018. Patient had a significant course complicated by a seizure in the ED and GI hemorrhage from duodenal ulcer and left AV fistula malfunction- both of which have resolved.     Today he is being evaluated for anticoagulation management review and discussion of recent procedure. Fistulogram went well with no bleeding concerns. Will restart therapy tonight per discussion with patient. Patient is very happy to report all went well and he has no concerns. He denies any other concerns including fevers/chills, cough or cold symptoms, headaches, vision changes, chest pain/pressure, difficulty breathing, SOB, abdominal pain, nausea, vomiting, diarrhea, dysuria, increasing weakness, increasing pain.     Past Medical History:   Diagnosis Date     Anaclitic depression      Anemia      Aneurysm of abdominal aorta (H)      Atherosclerosis of aorta (H)      Atherosclerosis of coronary artery      Atrial fibrillation (H)      Atrial flutter (H)      CKD (chronic kidney disease)      Coronary artery disease due to calcified coronary lesion 1/12/2017    Severe RCA and LCX disease; see report of 12 Jan 17 by Dr. Julian Tom      Diabetes mellitus (H)      Dyslipidemia, goal LDL below 70      End-stage renal disease on hemodialysis (H)     hemodialysis 3 times  a week     ESRD (end stage renal disease) (H)      Essential hypertension      History of stroke 1976     Lactose intolerance      Peripheral arterial occlusive disease (H)      Peripheral artery disease (H)     axillary and subclavian disease     Prostate cancer (H) 2004     Type 2 diabetes mellitus (H)      Typical atrial flutter (H) 10/18/2016     Unstable angina pectoris (H)      Vascular dementia              Family History   Problem Relation Age of Onset     Stroke Mother 62     cerebral hemorrhage     Cancer Father      No Medical Problems Son      No Medical Problems Son      Social History     Social History     Marital status:      Spouse name: N/A     Number of children: 2     Years of education: N/A     Occupational History      Retired     Social History Main Topics     Smoking status: Former Smoker     Types: Cigarettes     Start date: 1970     Quit date: 5/17/2005     Smokeless tobacco: Never Used     Alcohol use No     Drug use: No      Comment: formerly used     Sexual activity: Not Currently     Other Topics Concern     Not on file     Social History Narrative       REVIEW OF SYSTEM:  Pertinent items are noted in HPI.    PHYSICAL EXAM:   /54  Pulse 65  Temp 98  F (36.7  C)  Resp 18  Wt 157 lb (71.2 kg)  SpO2 98%  BMI 23.18 kg/m2  General appearance: alert, appears stated age and cooperative  Head: Normocephalic, without obvious abnormality, atraumatic  Lungs: respirations without effort, LS CTA  CVS: S1, S2, no murmur/rub/gallop/click appreciated  Abdomen: soft, nontender, BSx4  Extremities: extremities normal, atraumatic, no cyanosis or edema, AV fistula in LUE-thrill palpated  Pulses: 2+ and symmetric  Skin: Skin color, texture, turgor normal. No rashes or lesions  Neurologic: Grossly normal      LABS:   None today, INR scheduled.     ASSESSMENT:      ICD-10-CM    1. Anticoagulation management encounter Z51.81     Z79.01        PLAN:    Anticoagulation Management around  Fistulogram  -Fistulogram was completed today and went well.   -Resume Heparin tonight.   -Start Coumadin tomorrow, continue with daily INRs.   -Plan for patient to stay on heparin until INR is greater than 2 for 2 consecutive INRs.   -Follow up as needed.     Otherwise continue current care plan for all other chronic medical conditions, as they are stable. Encouraged patient to engage in healthy lifestyle behaviors such as engaging in social activities, exercising (PT/OT), eating well, and following care plan. Follow up for routine check-up, or sooner if needed. Will continue to monitor patient and work with nursing staff collaboratively to work toward positive patient outcomes.    Greater than 25 minutes spent with patient with at least 55% of this time spent on review of previous records, counseling, education, and discussion of the above care plan with nursing staff, and patient.     Electronically signed by: Katie Victor CNP

## 2021-06-19 NOTE — PROGRESS NOTES
[facility] Patient Outreach:     Discharge Date::  8/4/18      Reason for TCU stay (discharge diagnosis)::  Falls, ESRD, seizures, AMS, anemia, A-fib, GI bleed      Are you feeling better, the same or worse since your discharge?:  Patient is feeling better (a couple falls since being home.  bruise on lower back.  No increased pain since falls.  Denies hitting head.  )          As part of your discharge plan, did they discuss home care with you?: Yes        Have your seen them yet, or are they scheduled to visit?: Yes                Do you have any follow up visits scheduled with your PCP or Specialist?:  Yes, with PCP      (RN) Is it scheduled soon enough (3-5 days)?: Yes

## 2021-06-19 NOTE — PROGRESS NOTES
Office Visit - Follow up    Manuel Marrero   78 y.o. male    Date of Visit: 8/14/2018    Chief Complaint   Patient presents with     Hospital Visit Follow Up       Subjective: Diabetes mellitus type 2.  On insulin.  End-stage renal disease on hemodialysis for 8 years.  Blood pressure has been low.  Region 6 June 2017 through the 27th [facility] till August 4, 2018.  Home care PT OT nurses wound on buttocks appears to be a decubitus.  Wound care ordered.  He has had 2 falls balance is poor nasolabial fold depression is noted left side the patient is wheelchair-bound he does have hemodialysis going 3 times a week for end-stage renal disease.  No syncope no chest pain or shortness of breath.    No blood in stool he is an uric.  Medication list reviewed today 108/58.  Hold minoxidil.    Allergies contrast media iodine lactose and scallops.    ROS: A comprehensive review of systems was performed and was otherwise negative    Medications:  Prior to Admission medications    Medication Sig Start Date End Date Taking? Authorizing Provider   acetaminophen (TYLENOL) 500 MG tablet Take 500 mg by mouth every 6 (six) hours as needed for pain.   Yes PROVIDER, HISTORICAL   aspirin 81 mg chewable tablet Chew 81 mg daily.   Yes PROVIDER, HISTORICAL   calcium acetate (PHOSLYRA) 667 mg (169 mg calcium)/5 mL Soln solution Take 1,334 mg by mouth 3 (three) times a day.   Yes PROVIDER, HISTORICAL   cinacalcet (SENSIPAR) 30 MG tablet Take 30 mg by mouth daily.   Yes PROVIDER, HISTORICAL   cloNIDine HCl (CATAPRES) 0.1 MG tablet Take 0.1 mg by mouth at bedtime.   Yes PROVIDER, HISTORICAL   FLUoxetine (PROZAC) 20 MG capsule Take 20 mg by mouth daily.   Yes PROVIDER, HISTORICAL   furosemide (LASIX) 80 MG tablet Take 160 mg by mouth daily. Indications: Renal Disease with Edema   Yes PROVIDER, HISTORICAL   gabapentin (NEURONTIN) 100 MG capsule Take 200 mg by mouth daily. 7/27/18  Yes PROVIDER, HISTORICAL   isosorbide mononitrate (IMDUR) 30 MG  24 hr tablet Take 30 mg by mouth daily.   Yes PROVIDER, HISTORICAL   losartan (COZAAR) 50 MG tablet Take 50 mg by mouth at bedtime. Indications: hypertension 8/9/18  Yes PROVIDER, HISTORICAL   metoprolol tartrate (LOPRESSOR) 100 MG tablet Take 100 mg by mouth 2 (two) times a day.   Yes PROVIDER, HISTORICAL   minoxidil (LONITEN) 2.5 MG tablet Take 2.5 mg by mouth daily. 7/27/18  Yes PROVIDER, HISTORICAL   multivitamin (ONE A DAY) per tablet Take 1 tablet by mouth daily. Indications: Vitamin Deficiency Prevention   Yes PROVIDER, HISTORICAL   niacin (NIASPAN) 500 MG CR tablet Take 500 mg by mouth at bedtime.   Yes PROVIDER, HISTORICAL   omeprazole (PRILOSEC OTC) 20 MG tablet Take 20 mg by mouth 2 (two) times a day.   Yes PROVIDER, HISTORICAL   psyllium (METAMUCIL) 0.52 gram capsule Take 0.52 g by mouth daily.   Yes PROVIDER, HISTORICAL   warfarin sodium (WARFARIN ORAL) Take by mouth. 7/26/18 INR 1.0  Take 5mg on 7/26.  Continue Heparin 5000 units two times a day.  Next INR 7/27/18. 7/26/18  Yes PROVIDER, HISTORICAL   clarithromycin (BIAXIN) 250 MG tablet Take 250 mg by mouth daily. clarithromycin  tablet; 250 mg; oral  Special Instructions: Scheduled at 4pm due to Dr. kelley him to have after  dialysis on MWF  Once A Day;  8/14/18 Yes PROVIDER, HISTORICAL   atorvastatin (LIPITOR) 80 MG tablet Take 80 mg by mouth daily.    PROVIDER, HISTORICAL   amoxicillin (AMOXIL) 500 MG tablet Take 500 mg by mouth daily. amoxicillin  tablet; 500 mg; oral  Special Instructions: Scheduled at this time due to Dr. kelley given after dialysis  on MWF.  Once A Day;  8/14/18  PROVIDER, HISTORICAL       Allergies:   Allergies   Allergen Reactions     Contrast [Iohexol]      Contrast Dye     Iodinated Contrast- Oral And Iv Dye Hives     And shortness of breath.   This is the contrast dye used in angiograms     Iodine Hives     And convulsions from ivp dye     Lactose Diarrhea     Scallops Nausea And Vomiting       Immunizations:   Immunization  History   Administered Date(s) Administered     Influenza, inj, historic,unspecified 11/12/2007, 10/09/2008     Pneumo Conj 13-V (2010&after) 05/05/2016     Pneumo Polysac 23-V 04/20/2006     Td,adult,historic,unspecified 06/15/2006     Tdap 05/05/2016     ZOSTER, LIVE 05/12/2016       Exam Chest clear to auscultation and percussion.  Heart tones regular rhythm without murmur rub or gallop.  Abdomen soft nontender no organomegaly.  No peritoneal signs.  Extremities free of edema cyanosis or clubbing.  Neck veins nondistended no thyromegaly or scleral icterus noted, carotids full.  Skin warm and dry easily conversant good spirited.  Normal intelligence.  Neurologically intact no gross localizing findings.  Bilateral carotid bruits noted son at his side wheelchair-bound.  Nasolabial fold depression noted one side.  Extremities free of edema.  Belly soft nontender.  Bowel sounds present hypoactive.  Heart tones sound regular.  Chronic warfarin therapy addressed.    Assessment and Plan  Diabetes mellitus type 2 check A1c blood sugar lipid panel today.    Decubitus ulcer buttocks wound care ordered.    Multiple drug allergies.    End-stage renal disease on hemodialysis.  Recent CVA with falls ×2 and borderline blood pressure readings low.  Hold minoxidil other meds and cares same.  Continue warfarin same.  RTC 1 month's time.    Time: total time spent with the patient was 40 minutes of which >50% was spent in counseling and coordination of care        Cuco Bennett MD    Patient Active Problem List   Diagnosis     Aneurysm Of The Abdominal Aorta     Type 2 diabetes mellitus with kidney complication (H)     Chronic Renal Failure With Uremic Nephropathy     Vascular dementia     Depression     Lactose intolerance     Unstable angina (H)     Peripheral artery disease (H)     Typical atrial flutter (H)     Essential hypertension     Hyperlipidemia LDL goal <70     Anemia     Allergy to iodine compound      Atherosclerosis of aorta (H)     ESRD (end stage renal disease) on dialysis (H)     Coronary artery disease due to calcified coronary lesion     Atrial fibrillation [I48.91]     Physical deconditioning     Falls     Anticoagulation management encounter

## 2021-06-19 NOTE — PROGRESS NOTES
[facility]    Facility:   [facility] [[id number]]   Code Status: POLST AVAILABLE      CHIEF COMPLAINT/REASON FOR VISIT:  Chief Complaint   Patient presents with     Review Of Multiple Medical Conditions     physical deconditioning, falls       HISTORY:      HPI: Manuel is a 78 y.o. male presenting to [facility] for rehabilitation s/p falls, ESRD with AMS and Metabolic Encephalopathy. In addition he was found to have the following secondary problems: new onset seizure, acute on chronic anemia, h. Pylori, chest pain, and paroxysmal atrial fibrillation. He was admitted to [facility] on 6/17/2018 and discharged to the TCU on 6/27/2018. Patient had a significant course complicated by a seizure in the ED and GI hemorrhage from duodenal ulcer and left AV fistula malfunction- both of which have resolved.     Today he is being evaluated for anticoagulation management review and a review of multiple medical problems. He states he has been doing great except for left ankle pain that has just started without provocation. He cannot give any more information regarding the left ankle pain, just states it is sore. He denies any other concerns including fevers/chills, cough or cold symptoms, headaches, vision changes, chest pain/pressure, difficulty breathing, SOB, abdominal pain, nausea, vomiting, diarrhea, dysuria, increasing weakness, increasing pain.     Past Medical History:   Diagnosis Date     Anaclitic depression      Anemia      Aneurysm of abdominal aorta (H)      Atherosclerosis of aorta (H)      Atherosclerosis of coronary artery      Atrial fibrillation (H)      Atrial flutter (H)      CKD (chronic kidney disease)      Coronary artery disease due to calcified coronary lesion 1/12/2017    Severe RCA and LCX disease; see report of 12 Jan 17 by Dr. Julian Tom      Diabetes mellitus (H)      Dyslipidemia, goal LDL below 70      End-stage renal disease on  hemodialysis (H)     hemodialysis 3 times a week     ESRD (end stage renal disease) (H)      Essential hypertension      History of stroke 1976     Lactose intolerance      Peripheral arterial occlusive disease (H)      Peripheral artery disease (H)     axillary and subclavian disease     Prostate cancer (H) 2004     Type 2 diabetes mellitus (H)      Typical atrial flutter (H) 10/18/2016     Unstable angina pectoris (H)      Vascular dementia              Family History   Problem Relation Age of Onset     Stroke Mother 62     cerebral hemorrhage     Cancer Father      No Medical Problems Son      No Medical Problems Son      Social History     Social History     Marital status:      Spouse name: N/A     Number of children: 2     Years of education: N/A     Occupational History      Retired     Social History Main Topics     Smoking status: Former Smoker     Types: Cigarettes     Start date: 1970     Quit date: 5/17/2005     Smokeless tobacco: Never Used     Alcohol use No     Drug use: No      Comment: formerly used     Sexual activity: Not Currently     Other Topics Concern     Not on file     Social History Narrative       REVIEW OF SYSTEM:  Pertinent items are noted in HPI.    PHYSICAL EXAM:   /52  Pulse 64  Temp 97.4  F (36.3  C)  Resp 18  Wt 162 lb 3.2 oz (73.6 kg)  SpO2 99%  BMI 23.95 kg/m2  General appearance: alert, appears stated age and cooperative  Head: Normocephalic, without obvious abnormality, atraumatic  Lungs: respirations without effort, LS CTA  CVS: S1, S2, no murmur/rub/gallop/click appreciated  Abdomen: soft, nontender, BSx4  Extremities: extremities normal, atraumatic, no cyanosis or edema, AV fistula in LUE-thrill palpated  Pulses: 2+ and symmetric  Skin: Skin color, texture, turgor normal. No rashes or lesions  Neurologic: Grossly normal      LABS:   None today, INR scheduled.     ASSESSMENT:      ICD-10-CM    1. Physical deconditioning R53.81    2. Fall, subsequent  encounter W19.XXXD        PLAN:    Physical Deconditioning  -Continue PT/OT and other therapies as per care plan.  -Encouraged good nutrition and movement habits.   -Discussed care plan and expected course of stay.   -Continue to follow-up per routine schedule or sooner if needed.     Anticoagulation Management  -INR subtherapeutic.   -Increase Coumadin to 8 mg by mouth daily.   -Recheck INR tomorrow.   -Follow up daily until therapeutic.     Left Ankle Pain   -Ice ankle 4 times daily.   -Ace wrap ankle during awake hours.   -Follow up as needed.    Otherwise continue current care plan for all other chronic medical conditions, as they are stable. Encouraged patient to engage in healthy lifestyle behaviors such as engaging in social activities, exercising (PT/OT), eating well, and following care plan. Follow up for routine check-up, or sooner if needed. Will continue to monitor patient and work with nursing staff collaboratively to work toward positive patient outcomes.    Greater than 25 minutes spent with patient with at least 55% of this time spent on review of previous records, counseling, education, and discussion of the above care plan with nursing staff, and patient.     Electronically signed by: Katie Victor CNP

## 2021-06-19 NOTE — PROGRESS NOTES
[facility]      Facility:    [facility] [[id number]]  Code Status: see previous      Chief Complaint/Reason for Visit:  Chief Complaint   Patient presents with     H & P       HPI:   Manuel is a 78 y.o. male who has a complex medical history and presented to the hospital after falling.  The fall was not witnessed, but his son lives with him about his father on the floor after he heard him banging on the door for help.  Apparently his dad had some chest pain, shortness of breath, and palpitations prior to falling.  There was no associated dizziness or lightheadedness.  His son had noticed that on Friday, 6/15/18 that he started to complain of some vague chest pain and bilateral leg cramping and that he just did not look well and was walking more gingerly and struggling to keep his balance.  This was after he had undergone his dialysis for the day.    His underlying medical conditions include hemodialysis dependent end-stage renal disease, aneurysm of the abdominal aorta, atrial fibrillation, coronary artery disease, depression, hypertension, peripheral arterial occlusive disease, type 2 diabetes mellitus, and vascular dementia.    During the hospital course, there was no evidence for acute bleed or stroke but there was encephalomalacia consistent with past stroke.  This was discovered in the workup of new onset seizure.  He was loaded with antiseizure medication.  His blood pressure dropped and adjustments of blood pressure medications were done as a result of this.  In the discharge summary there is note that the expected blood pressure medication will need to be adjusted again as he gets more active.    Upon current review of systems he has been having a burning pain of his feet and there was some blistering of his heel that is resolving now.  He has been having more fluctuations of blood pressure and generally higher blood pressure.  His son notes that at home he was on metoprolol  200 mg twice daily rather than his current 100 mg twice daily.  He also had been on losartan 50 mg but now he is taking 100 mg.  He had been on minoxidil 5 mg at home, and in the discharge summary there was discussion of possible starting of 2.5 mg in the future.  Is not experiencing any fevers or chills.  Now he is not having chest pain or shortness of breath nor nausea or abdominal distress.    Past Medical History:  Past Medical History:   Diagnosis Date     Anaclitic depression      Anemia      Aneurysm of abdominal aorta (H)      Atherosclerosis of aorta (H)      Atherosclerosis of coronary artery      Atrial fibrillation (H)      Atrial flutter (H)      CKD (chronic kidney disease)      Coronary artery disease due to calcified coronary lesion 1/12/2017    Severe RCA and LCX disease; see report of 12 Jan 17 by Dr. Jordan     Depression      Diabetes mellitus (H)      Dyslipidemia, goal LDL below 70      End-stage renal disease on hemodialysis (H)     hemodialysis 3 times a week     ESRD (end stage renal disease) (H)      Essential hypertension      History of stroke 1976     Lactose intolerance      Peripheral arterial occlusive disease (H)      Peripheral artery disease (H)     axillary and subclavian disease     Prostate cancer (H) 2004     Type 2 diabetes mellitus (H)      Typical atrial flutter (H) 10/18/2016     Unstable angina pectoris (H)      Vascular dementia            Surgical History:  Past Surgical History:   Procedure Laterality Date     CARDIAC CATHETERIZATION N/A 1/12/2017    Procedure: Coronary Angiogram;  Surgeon: Sheri Jordan MD;  Location: [facility] Cath Lab;  Service:      CARDIOVERSION  02/09/2017     LAPAROSCOPIC CHOLECYSTECTOMY N/A 2/11/2016    Procedure:   LAPAROSCOPIC CHOLECYSTECTOMY ;  Surgeon: Last Guevara MD;  Location: [facility] OR;  Service:      GA CATH PLMT L HRT & ARTS W/NJX & ANGIO IMG S&I Left 1/12/2017    Procedure: Left Heart Catheterization Without Left  Ventriculogram;  Surgeon: Sheri Jordan MD;  Location: [facility] Cath Lab;  Service: Cardiology     PROSTATECTOMY  2004    prostate cancer       Family History:   Family History   Problem Relation Age of Onset     Stroke Mother 62     cerebral hemorrhage     Cancer Father      No Medical Problems Son      No Medical Problems Son        Social History:    Social History     Social History     Marital status:      Spouse name: N/A     Number of children: 2     Years of education: N/A     Occupational History      Retired     Social History Main Topics     Smoking status: Former Smoker     Types: Cigarettes     Start date: 1970     Quit date: 5/17/2005     Smokeless tobacco: Never Used     Alcohol use No     Drug use: No      Comment: formerly used     Sexual activity: Not Currently     Other Topics Concern     Not on file     Social History Narrative          Review of Systems   All other systems reviewed and are negative.      Vitals:    07/05/18 2038   BP: 131/66   Pulse: 72   Resp: 17   Temp: 98.2  F (36.8  C)   SpO2: 98%   Weight: 163 lb (73.9 kg)       Physical Exam   Constitutional: No distress.   HENT:   Mouth/Throat: Oropharynx is clear and moist.   Eyes: Right eye exhibits no discharge. Left eye exhibits no discharge.   Neck: No JVD present. No thyromegaly present.   Cardiovascular: Normal rate and normal heart sounds.    Pulmonary/Chest: Breath sounds normal. No respiratory distress.   Abdominal: Soft. Bowel sounds are normal. He exhibits no distension. There is no tenderness.   Musculoskeletal: He exhibits no edema.   Lymphadenopathy:     He has no cervical adenopathy.   Neurological: He is alert.   He is 0 of 3 for word recall but his clock face is normal.   Skin:   I do not see any blisters currently.  He is tender along the arches of the foot on the sole.   Psychiatric: He has a normal mood and affect.   Nursing note and vitals reviewed.      Medication List:  Current Outpatient Prescriptions    Medication Sig     acetaminophen (TYLENOL) 500 MG tablet Take 500 mg by mouth every 6 (six) hours as needed for pain.     amoxicillin (AMOXIL) 500 MG tablet Take 500 mg by mouth daily. amoxicillin  tablet; 500 mg; oral  Special Instructions: Scheduled at this time due to Dr. kelley given after dialysis  on MWF.  Once A Day;     atorvastatin (LIPITOR) 80 MG tablet Take 80 mg by mouth daily.     calcium acetate (PHOSLYRA) 667 mg (169 mg calcium)/5 mL Soln solution Take 1,334 mg by mouth 3 (three) times a day.     cinacalcet (SENSIPAR) 30 MG tablet Take 30 mg by mouth daily.     clarithromycin (BIAXIN) 250 MG tablet Take 250 mg by mouth daily. clarithromycin  tablet; 250 mg; oral  Special Instructions: Scheduled at 4pm due to Dr. kelley him to have after  dialysis on MWF  Once A Day;     cloNIDine HCl (CATAPRES) 0.1 MG tablet Take 0.1 mg by mouth at bedtime.     isosorbide mononitrate (IMDUR) 30 MG 24 hr tablet Take 30 mg by mouth daily.     levETIRAcetam (KEPPRA) 500 MG tablet Take 500 mg by mouth every evening.     losartan (COZAAR) 100 MG tablet Take 100 mg by mouth at bedtime.     metoprolol tartrate (LOPRESSOR) 100 MG tablet Take 100 mg by mouth 2 (two) times a day.     niacin (NIASPAN) 500 MG CR tablet Take 500 mg by mouth at bedtime.     omeprazole (PRILOSEC OTC) 20 MG tablet Take 20 mg by mouth 2 (two) times a day.     psyllium (METAMUCIL) 0.52 gram capsule Take 0.52 g by mouth daily.     warfarin (COUMADIN) 5 MG tablet Take 5 mg by mouth See Admin Instructions. Coumadin (warfarin)  tablet; 5 mg; amt: 5 mg; oral  Once A Day on Sun, Tue, Thu; 08:00 PM  **Awaiting Verification     warfarin (COUMADIN) 7.5 MG tablet Take 7.5 mg by mouth See Admin Instructions. Coumadin (warfarin)  tablet; 7.5 mg; amt: 7.5 mg; oral  Once A Day on Mon, Wed, Fri, Sat; 08:00 PM  **Awaiting Verification       Labs:  No new laboratory testing    Assessment:    ICD-10-CM    1. Seizure (H) R56.9    2. Essential hypertension I10    3. ESRD  (end stage renal disease) on dialysis (H) N18.6     Z99.2    4. Paroxysmal atrial fibrillation (H) I48.0        Plan:  Increase the metoprolol to 200 mg twice daily.  If this does not maintain systolic blood pressure consistently below 180, then reintroduction of minoxidil at either 2.5 mg or 5 mg would be indicated.  Ketoconazole topical 2% cream applied to feet daily for the next 2 weeks.  I did discuss these issues with his son by telephone.      Electronically signed by: Tone Kamara MD

## 2021-06-19 NOTE — PROGRESS NOTES
INR 3.3 will decrease dose to 7.5 mg Wed and Fri and 5 mg all other days. Retest in 1 week. After talking with pt and discussing history of greens/salads and medication change. Pt will  continue  with current diet and dosing of Warfarin.  Continue with moderation of Vit K and green leafy vegetables. Cautioned to call with increase bruising or bleeding. Reminded to call with medication change especially antibiotic. Call with any questions or concerns or any up coming procedures. Cautioned about using Herbal medication.  INR with home health.

## 2021-06-20 NOTE — PROGRESS NOTES
INR 2.6 INR stable. Discussed continuing management of dose of Warfarin and returning in one month . No changes to diet needed at this time. Continue moderate intake of Vitamin K and call if increase bruising or unexplained bleeding. Call with medication changes or upcoming procedures.  Spoke with son.   
constitutional, denies fever/ chills/ nausea/vomiting/dyspnea/dysphagia,  heent per hpi  cardiovascular: denies chest pain, SOB, denies palpitations  respiratory: denies cough, SOB,

## 2021-06-20 NOTE — PROGRESS NOTES
INR 4.3 will hold warfarin today and then decrease to 5 mg daily. Retest in one week. After talking with pt and discussing history of greens/salads and medication change. Pt will  continue  with current diet and dosing of Warfarin.  Continue with moderation of Vit K and green leafy vegetables. Cautioned to call with increase bruising or bleeding. Reminded to call with medication change especially antibiotic. Call with any questions or concerns or any up coming procedures. Cautioned about using Herbal medication.  INR done with home care.

## 2021-06-20 NOTE — PROGRESS NOTES
[facility] Consult Note    Date of Service:  8/28/2018    Requesting Provider: Dr. Cuco Bennett    Chief Complaint: buttock wound    History of Present Illness: Manuel Marrero is being seen at the Vascular Clinic today regarding buttock wound. They arrive to the clinic today with son Manuel. The patient reports that he fell 6 weeks ago; was hospitalized for 10 days; then d/c to [facility] home for 4 weeks now back at home with his son; has home care coming into the home 5 days per week with PT/OT. He has hx of ESRD on HD. He no longer makes urine. The wound started 2-3 months ago before the fall while at home; that is when it was first noted. Was having constant diarrhea at that time; stools are now solid; but getting softer over the past few days. He makes it to the restroom for the most part. He has diabetes; not checking fs; check random glucose at the PCP visits. Was previously using Asmita ointment to the buttocks wounds; also has tried desitin. He has new w/c and new w/c cushion. Reports pain of 3/10 sitting makes this worse; currently using apap for pain. Denies any fevers, chills, or generalized ill feeling. + history of cancer prostate cancer; had prostatectomy no chemo or radiation. Sleeps in a bed with legs elevated at night; during the day he is in recliner most of the day; can only take a few steps. He has a w/c with new cushion.       Review of Systems:   Constitutional:  negative  for fever, chills or night sweats  EENTM: negative for glasses;  negative Huslia  GI:  negative for nausea/vomiting;  negative for constipation + diarrhea;  rare for fecal incontinence negative weight loss  :  negative dysuria, negative incontinence; ESRD does not make urine  MS: patient is not ambulatory;  does use assistive devices  Cardiac:  positive afib on coumadin  Respiratory:  negative SOB  Endocrine:  + diabetes  Psych:  negative depression/anxiety    Past Medical History:    Past Medical History:    Diagnosis Date     Allergy to iodine compound     He reports hives and convulsions with iodinated contrast media     Anaclitic depression      Anemia      Aneurysm of abdominal aorta (H)      Aneurysm Of The Abdominal Aorta      Anticoagulation management encounter 7/8/2018     Atherosclerosis of aorta (H)      Atherosclerosis of coronary artery      Atrial fibrillation (H)      Atrial flutter (H)      Chronic Renal Failure With Uremic Nephropathy     HD on Mon-Wed-Fri     CKD (chronic kidney disease)      Coronary artery disease due to calcified coronary lesion 1/12/2017    Severe RCA and LCX disease; see report of 12 Jan 17 by Dr. Jordan     Depression      Diabetes mellitus (H)      Dyslipidemia, goal LDL below 70      End-stage renal disease on hemodialysis (H)     hemodialysis 3 times a week     ESRD (end stage renal disease) (H)      ESRD (end stage renal disease) on dialysis (H)      Essential hypertension      Falls 7/6/2018     History of stroke 1976     Hyperlipidemia LDL goal <70      Lactose intolerance      Peripheral arterial occlusive disease (H)      Peripheral artery disease (H)     axillary and subclavian disease     Physical deconditioning 7/6/2018     Prostate cancer (H) 2004     Type 2 diabetes mellitus (H)      Type 2 diabetes mellitus with kidney complication (H)      Typical atrial flutter (H) 10/18/2016     Unstable angina (H) 1/10/2017     Unstable angina pectoris (H)      Vascular dementia         Surgical History:   Past Surgical History:   Procedure Laterality Date     CARDIAC CATHETERIZATION N/A 1/12/2017    Procedure: Coronary Angiogram;  Surgeon: Sheri Jordan MD;  Location: [facility] Cath Lab;  Service:      CARDIOVERSION  02/09/2017     LAPAROSCOPIC CHOLECYSTECTOMY N/A 2/11/2016    Procedure:   LAPAROSCOPIC CHOLECYSTECTOMY ;  Surgeon: Last Guevara MD;  Location: [facility] OR;  Service:      AK CATH PLMT L HRT & ARTS W/NJX & ANGIO IMG S&I Left 1/12/2017     Procedure: Left Heart Catheterization Without Left Ventriculogram;  Surgeon: Sheri Jordan MD;  Location: [facility] Cath Lab;  Service: Cardiology     PROSTATECTOMY  2004    prostate cancer        Medications:    Current Outpatient Prescriptions:      acetaminophen (TYLENOL) 500 MG tablet, Take 500 mg by mouth every 6 (six) hours as needed for pain., Disp: , Rfl:      aspirin 81 mg chewable tablet, Chew 81 mg daily., Disp: , Rfl:      atorvastatin (LIPITOR) 80 MG tablet, Take 80 mg by mouth daily., Disp: , Rfl:      cinacalcet (SENSIPAR) 30 MG tablet, Take 30 mg by mouth daily., Disp: , Rfl:      cloNIDine HCl (CATAPRES) 0.1 MG tablet, Take 0.1 mg by mouth at bedtime., Disp: , Rfl:      FLUoxetine (PROZAC) 20 MG capsule, Take 1 capsule (20 mg total) by mouth daily., Disp: 90 capsule, Rfl: 5     furosemide (LASIX) 80 MG tablet, Take 160 mg by mouth daily. Indications: Renal Disease with Edema, Disp: , Rfl:      gabapentin (NEURONTIN) 100 MG capsule, Take 200 mg by mouth daily., Disp: , Rfl:      isosorbide mononitrate (IMDUR) 30 MG 24 hr tablet, Take 30 mg by mouth daily., Disp: , Rfl:      losartan (COZAAR) 50 MG tablet, Take 50 mg by mouth at bedtime. Indications: hypertension, Disp: , Rfl:      metoprolol tartrate (LOPRESSOR) 100 MG tablet, Take 100 mg by mouth 2 (two) times a day., Disp: , Rfl:      multivitamin (ONE A DAY) per tablet, Take 1 tablet by mouth daily. Indications: Vitamin Deficiency Prevention, Disp: , Rfl:      niacin (NIASPAN) 500 MG CR tablet, Take 500 mg by mouth at bedtime., Disp: , Rfl:      omeprazole (PRILOSEC OTC) 20 MG tablet, Take 1 tablet (20 mg total) by mouth 2 (two) times a day., Disp: , Rfl: 0     psyllium (METAMUCIL) 0.52 gram capsule, Take 0.52 g by mouth daily., Disp: , Rfl:      warfarin (COUMADIN) 5 MG tablet, Take 7.5 mg Wed and sat and 5 mg all other days of the week., Disp: 100 tablet, Rfl: 0     warfarin sodium (WARFARIN ORAL), Take by mouth. 7/26/18 INR 1.0  Take 5mg on  "7/26.  Continue Heparin 5000 units two times a day.  Next INR 7/27/18., Disp: , Rfl:      calcium acetate (PHOSLYRA) 667 mg (169 mg calcium)/5 mL Soln solution, Take 1,334 mg by mouth 3 (three) times a day., Disp: , Rfl:      minoxidil (LONITEN) 2.5 MG tablet, Take 2.5 mg by mouth daily., Disp: , Rfl:     Current Facility-Administered Medications:      methylPREDNISolone tablet 32 mg (MEDROL), 32 mg, Oral, Once, Radha Choi MD     methylPREDNISolone tablet 32 mg (MEDROL), 32 mg, Oral, Once, Radha Choi MD    Allergies:   Allergies   Allergen Reactions     Contrast [Iohexol]      Contrast Dye     Iodinated Contrast- Oral And Iv Dye Hives     And shortness of breath.   This is the contrast dye used in angiograms     Iodine Hives     And convulsions from ivp dye     Lactose Diarrhea     Scallops Nausea And Vomiting       Family history:   Family History   Problem Relation Age of Onset     Stroke Mother 62     cerebral hemorrhage     Cancer Father      No Medical Problems Son      No Medical Problems Son         Social History:   Social History     Social History     Marital status:      Spouse name: N/A     Number of children: 2     Years of education: N/A     Occupational History      Retired     Social History Main Topics     Smoking status: Former Smoker     Types: Cigarettes     Start date: 1970     Quit date: 5/17/2005     Smokeless tobacco: Never Used     Alcohol use No     Drug use: No      Comment: formerly used     Sexual activity: Not Currently     Other Topics Concern     Not on file     Social History Narrative        Physical Exam  Vitals: Blood pressure 110/62, pulse 60, temperature 98.2  F (36.8  C), temperature source Oral, resp. rate 16, height 5' 10\" (1.778 m), weight 155 lb 4.8 oz (70.4 kg).  General: This is a 78 y.o. male who appears their reported age, not in acute distress  Head: normocephalic, Atraumatic; not wearing glasses; non-icteric sclera; no exudate; mild " hearing loss   Respiratory: Clear throughout all lung fields; unlabored breathing; no cough   Cardiac: Irregular, Rate and Rhythm, no murmurs appreciated   Skin: Uniformly warm and dry  Psych: sleepy today; rouses to mild stimuli; calm and cooperative throughout exam  Abdomen: Normal bowel sounds. Soft, symmetric, no guarding or rigidity, nontender with palpation.  No organomegaly or masses palpated.   GI: no fecal incontinence during exam  Buttocks: bilateral sacral borders with partial thickness ulcers; very superficial; covered with fibrinous material this was debrided; no surrounding erythema; difficult to assess due to darkly pigmented skin; no induration; minimally tender to palpation    Circumferential volume measures:    No flowsheet data found.    Ulceration(s)/Wound(s):     VASC Wound 08/28/18 Rt Buttocks (Active)   Pre Size Length 1 8/28/2018  2:00 PM   Pre Size Width 0.7 8/28/2018  2:00 PM   Pre Size Depth 0.1 8/28/2018  2:00 PM       VAS Wound 08/28/18 Lt buttock (Active)   Pre Size Length 0.7 8/28/2018  2:00 PM   Pre Size Width 0.6 8/28/2018  2:00 PM   Pre Size Depth 0.1 8/28/2018  2:00 PM       Wound 08/07/18 Other wound type (comment) Knee Left (Active)       Wound 08/07/18 Other wound type (comment) Heel Left (Active)       Wound 08/07/18 Other wound type (comment) Leg Left;Anterior (front);Distal (further from center) (Active)       Wound 08/07/18 Other wound type (comment) Coccyx Left (Active)   Site Assessment Clean;Dry 8/27/2018  8:20 AM   Surrounding Tissue Assessment Clean;Dry 8/27/2018  8:20 AM       Wound 08/07/18 Other wound type (comment) Coccyx Right (Active)   Site Assessment Clean 8/27/2018  8:20 AM   Surrounding Tissue Assessment Clean 8/27/2018  8:20 AM       Laboratory studies:   No results found for: SEDRATE  Lab Results   Component Value Date    CREATININE 9.43 (HH) 03/20/2018     Lab Results   Component Value Date    HGBA1C 4.9 08/14/2018     Lab Results   Component Value Date     BUN 44 (H) 03/20/2018     Lab Results   Component Value Date    ALBUMIN 3.9 03/20/2018     No results found for: HEVVTCEE65US          Impression:   Stage 3 pressure injury right buttock  Stage 3 pressure injury left buttock  Inactivity  ESRD on HD      Assessment/Plan:  1. Excisional debridement of all the ulcer(s) was recommended today. After consent was obtained and 2% Lidocaine HCL jelly was applied all of the ulcers were excisionally debrided using a sterile curet under clean condition the epidermal and dermal  were sharply debrided for a total square cm of 1.12. Devitalized and non viable tissue was removed to improve granulation tissue formation, stimulate wound healing, decrease overall bacteria load, disrupt biofilm formation and decrease edge senescence. Patient tolerated this well and the ulcers appeared much  afterwards.    2. Edema. na    3. Treatment will have them start triad paste 1-2 times per day; no dressings; continue home care    4. Offloading: needs to reposition more frequently; advised to purchase better cushion; handouts provided; see AVS    5. Nutrition: getting protein supplement through dialysis; son focuses on protein in his diet    Patient to return to clinic in 4 week(s) for re-evaluation if wound not healing. They were instructed to call the clinic sooner with any further questions or concerns. Answered all questions.    Katrina Maguire DNP, RN, CNP, CWN  [facility]  [phone]      This note was electronically signed by Katrina Maguire

## 2021-06-20 NOTE — PROGRESS NOTES
INR 2.3 with home care. Continue current management dosing of Warfarin. Continue  diet of moderate Vitamin K intake. Discussed with pt the need to call with questions or concerns or any change in medication especially herbal medication or OTC. Call with increased bleeding or bruising or any upcoming procedures.  Spoke with Denise

## 2021-06-20 NOTE — PROGRESS NOTES
INR 2.3 continue with 5 mg daily. Missed 9/3 dose and will retest in one week with home care. After talking with pt and discussing history of greens/salads and medication change. Pt will  continue  with current diet and dosing of Warfarin.  Continue with moderation of Vit K and green leafy vegetables. Cautioned to call with increase bruising or bleeding. Reminded to call with medication change especially antibiotic. Call with any questions or concerns or any up coming procedures. Cautioned about using Herbal medication.

## 2021-06-20 NOTE — PROGRESS NOTES
Office Visit - Follow up    Manuel Marrero   78 y.o. male    Date of Visit: 9/27/2018    Chief Complaint   Patient presents with     Diabetes     Coronary Artery Disease     Hypertension       Subjective: Diabetes mellitus type 2 with hypertension coronary artery disease chronic kidney disease stage V hemodialysis dependent 3 times per week for 10 years.    Allergies include iodine lactose scallops contrast media.    No chest pain or shortness of breath he is on a new antiseizure medication divalproex ER to 50 mg.  Depakote.    No blood in stool or urine medication list reviewed well-tolerated.  Patient is followed by nephrologist on a regular basis and hemodialysis were labs are done not repeated by this examiner.  Dialysis has been going on for 10 years 3 times a week they are well-tolerated.  A month ago he developed hypotension during a dialysis run but this is not a usual occurrence.  He is wheelchair-bound attended today by his son Manuel Silva who is very supportive and understanding.    ROS: A comprehensive review of systems was performed and was otherwise negative    Medications:  Prior to Admission medications    Medication Sig Start Date End Date Taking? Authorizing Provider   aspirin 81 mg chewable tablet Chew 81 mg daily.   Yes PROVIDER, HISTORICAL   atorvastatin (LIPITOR) 80 MG tablet Take 80 mg by mouth daily.   Yes PROVIDER, HISTORICAL   cinacalcet (SENSIPAR) 30 MG tablet Take 30 mg by mouth daily.   Yes PROVIDER, HISTORICAL   cloNIDine HCl (CATAPRES) 0.1 MG tablet Take 0.1 mg by mouth at bedtime.   Yes PROVIDER, HISTORICAL   divalproex (DEPAKOTE ER) 250 MG 24 hour tablet Take 250 mg by mouth daily.   Yes PROVIDER, HISTORICAL   FLUoxetine (PROZAC) 20 MG capsule Take 1 capsule (20 mg total) by mouth daily. 8/27/18  Yes Cuco Bennett MD   furosemide (LASIX) 80 MG tablet Take 160 mg by mouth daily. Indications: Renal Disease with Edema   Yes PROVIDER, HISTORICAL   gabapentin (NEURONTIN) 100 MG  capsule Take 200 mg by mouth daily. 9/26/18  Yes Cuco Bennett MD   isosorbide mononitrate (IMDUR) 30 MG 24 hr tablet Take 30 mg by mouth daily.   Yes PROVIDER, HISTORICAL   metoprolol tartrate (LOPRESSOR) 100 MG tablet Take 100 mg by mouth 2 (two) times a day.   Yes PROVIDER, HISTORICAL   multivitamin (ONE A DAY) per tablet Take 1 tablet by mouth daily. Indications: Vitamin Deficiency Prevention   Yes PROVIDER, HISTORICAL   niacin (NIASPAN) 500 MG CR tablet Take 500 mg by mouth at bedtime.   Yes PROVIDER, HISTORICAL   omeprazole (PRILOSEC OTC) 20 MG tablet Take 1 tablet (20 mg total) by mouth 2 (two) times a day. 9/11/18  Yes Cuco Bennett MD   psyllium (METAMUCIL) 0.52 gram capsule Take 0.52 g by mouth daily.   Yes PROVIDER, HISTORICAL   warfarin (COUMADIN) 5 MG tablet Take 7.5 mg Wed and sat and 5 mg all other days of the week. 8/22/18  Yes Radha Choi MD   warfarin sodium (WARFARIN ORAL) Take by mouth. 7/26/18 INR 1.0  Take 5mg on 7/26.  Continue Heparin 5000 units two times a day.  Next INR 7/27/18. 7/26/18  Yes PROVIDER, HISTORICAL   acetaminophen (TYLENOL) 500 MG tablet Take 500 mg by mouth every 6 (six) hours as needed for pain.    PROVIDER, HISTORICAL   calcium acetate (PHOSLYRA) 667 mg (169 mg calcium)/5 mL Soln solution Take 1,334 mg by mouth 3 (three) times a day.    PROVIDER, HISTORICAL   losartan (COZAAR) 50 MG tablet Take 50 mg by mouth at bedtime. Indications: hypertension 8/9/18   PROVIDER, HISTORICAL   minoxidil (LONITEN) 2.5 MG tablet Take 2.5 mg by mouth daily. 7/27/18   PROVIDER, HISTORICAL       Allergies:   Allergies   Allergen Reactions     Contrast [Iohexol]      Contrast Dye     Iodinated Contrast- Oral And Iv Dye Hives     And shortness of breath.   This is the contrast dye used in angiograms     Iodine Hives     And convulsions from ivp dye     Lactose Diarrhea     Scallops Nausea And Vomiting       Immunizations:   Immunization History   Administered Date(s)  Administered     Influenza, inj, historic,unspecified 11/12/2007, 10/09/2008     Pneumo Conj 13-V (2010&after) 05/05/2016     Pneumo Polysac 23-V 04/20/2006     Td,adult,historic,unspecified 06/15/2006     Tdap 05/05/2016     ZOSTER, LIVE 05/12/2016       Exam Chest clear to auscultation and percussion.  Heart tones regular rhythm without murmur rub or gallop.  Abdomen soft nontender no organomegaly.  No peritoneal signs.  Extremities free of edema cyanosis or clubbing.  Neck veins nondistended no thyromegaly or scleral icterus noted, carotids full.  Skin warm and dry easily conversant good spirited.  Normal intelligence.  Neurologically intact no gross localizing findings.  Wheelchair-bound appears chronically ill there is some muscle atrophy but it is symmetric.  Nasolabial fold depression noted on one side nonicteric face is more full he is conversant.  Anuric.    Assessment and Plan  Diabetes mellitus type 2 with end-stage renal disease on hemodialysis.  Coronary artery disease hypertension.    Check today blood sugar A1c lipid panel.    Multiple drug allergies.    Wheelchair-bound.  Addendum O2 sats 98% on room air respiratory rate 18 and unlabored pulse 71 blood pressure 118/64 also followed by nephrology.  BMI is excellent at 22.45 and weight is down 2 pounds 152 today.    Time: total time spent with the patient was 25 minutes of which >50% was spent in counseling and coordination of care        Cuco Bennett MD    Patient Active Problem List   Diagnosis     Aneurysm Of The Abdominal Aorta     Type 2 diabetes mellitus with kidney complication (H)     Chronic Renal Failure With Uremic Nephropathy     Vascular dementia     Depression     Lactose intolerance     Unstable angina (H)     Peripheral artery disease (H)     Typical atrial flutter (H)     Essential hypertension     Hyperlipidemia LDL goal <70     Anemia     Allergy to iodine compound     Atherosclerosis of aorta (H)     ESRD (end stage renal  disease) on dialysis (H)     Coronary artery disease due to calcified coronary lesion     Atrial fibrillation [I48.91]     Physical deconditioning     Falls     Anticoagulation management encounter

## 2021-06-20 NOTE — PROGRESS NOTES
HPI: This patient presents to clinic today for cerumen removal. Has noticed some fullness of the right ear and muffled hearing, but denies otalgia, otorrhea, vertigo, change in true hearing or tinnitus. Denies other symptoms. Ear washed out 40yrs ago, but otherwise no significant history of recurrent cerumen.    Past medical history, surgical history, family history, social history, medications, and allergies have been reviewed and are documented above.     Review of Systems: a 10-system review was performed. Symptoms are noted in the HPI and on a scanned document in the computer chart.    Physical Examination:   GEN: no acute distress, alert and oriented  EYES: extraocular movements intact, pupils equal and round. Sclera clear.   EARS: right cerumen impaction and excess cerumen left cleared under binocular microscopy using suction/forceps. The tympanic membranes are noted to be intact and clear with no signs of infections, effusions, perforations, or retractions.  PULM: breathing comfortably on room air with no stertor or stridor. Chest expansion symmetric.  CARDS: no cyanosis or clubbing, normal carotid pulses    MEDICAL DECISION-MAKING: bilateral cerumen cleared under binocular microscopy without difficulty. Hearing restored to baseline. Follow-up PRN.

## 2021-06-21 NOTE — PROGRESS NOTES
Daily Note     Today's date: 3/2/2020  Patient name: Ban Swift  : 1972  MRN: [id number]  Referring provider: Abraham Turcios INR 6.2. Pt seizure medication change and will monitor Warfarin intake. Hold today's dose and the 2.5 mg tue and sat and 5 mg all other days. After talking with pt and discussing history of greens/salads and medication change. Pt will  continue  with current diet and dosing of Warfarin.  Continue with moderation of Vit K and green leafy vegetables. Cautioned to call with increase bruising or bleeding. Reminded to call with medication change especially antibiotic. Call with any questions or concerns or any up coming procedures. Cautioned about using Herbal medication.

## 2021-06-22 NOTE — PROGRESS NOTES
Office Visit - Follow up    Manuel Marrero   78 y.o. male    Date of Visit: 12/27/2018    Chief Complaint   Patient presents with     Hypertension     Diabetes       Subjective: Hypertension with diabetes mellitus type 2 and end-stage renal disease.    Hemodialysis 3 times a week.    1 spell of hypotension associated with body tremors lasting 10-15 seconds.    Patient noted to be on fluoxetine or Prozac.  These medications like SSRIs to lower the seizure threshold.  And the patient was so advised.  The patient is already on a taper every other day of Prozac will decrease and stop now.  As it may lower seizure threshold which may be a representation of the body tremors.  The patient has occasional hypotension while on hemodialysis although the spells of hypotension are better off clonidine.  He sleeps quite a bit of the day according to his son who accompanies him here today his appetite is good and there is no problems with sleep no urine output no blood in the stool no chest pain per se and no paroxysmal nocturnal dyspnea.    Medication list reviewed well-tolerated normal effects.    Allergies include contrast media plus and iodine lactose and scallops the latter causing nausea and vomiting.    ROS: A comprehensive review of systems was performed and was otherwise negative    Medications:  Prior to Admission medications    Medication Sig Start Date End Date Taking? Authorizing Provider   aspirin 81 mg chewable tablet Chew 81 mg daily.   Yes PROVIDER, HISTORICAL   atorvastatin (LIPITOR) 80 MG tablet TAKE 1 TABLET BY MOUTH AT  BEDTIME 11/5/18  Yes Radha Choi MD   calcium acetate (PHOSLYRA) 667 mg (169 mg calcium)/5 mL Soln solution Take 1,334 mg by mouth 3 (three) times a day. Has not been taking for a couple of months per the nurse at the Dialysis Center because  Phosphorus level was off   Yes PROVIDER, HISTORICAL   cinacalcet (SENSIPAR) 30 MG tablet Take 30 mg by mouth daily.   Yes PROVIDER,  HISTORICAL   divalproex (DEPAKOTE ER) 250 MG 24 hour tablet Take 500 mg by mouth daily.          Yes PROVIDER, HISTORICAL   FLUoxetine (PROZAC) 20 MG capsule Take 1 capsule (20 mg total) by mouth daily.  Patient taking differently: Take 20 mg by mouth every other day.        8/27/18  Yes Cuco Bennett MD   furosemide (LASIX) 80 MG tablet Take 160 mg by mouth daily. Indications: Renal Disease with Edema   Yes PROVIDER, HISTORICAL   gabapentin (NEURONTIN) 100 MG capsule Take 200 mg by mouth daily. 9/26/18  Yes Cuco Bennett MD   isosorbide mononitrate (IMDUR) 30 MG 24 hr tablet TAKE 1 TABLET BY MOUTH  DAILY 12/6/18  Yes Cuco Bennett MD   losartan (COZAAR) 50 MG tablet Take 50 mg by mouth at bedtime. Indications: hypertension 8/9/18  Yes PROVIDER, HISTORICAL   metoprolol tartrate (LOPRESSOR) 100 MG tablet Take 100 mg by mouth 2 (two) times a day.   Yes PROVIDER, HISTORICAL   multivitamin (ONE A DAY) per tablet Take 1 tablet by mouth daily. Indications: Vitamin Deficiency Prevention   Yes PROVIDER, HISTORICAL   niacin (NIASPAN) 500 MG CR tablet Take 500 mg by mouth at bedtime.   Yes PROVIDER, HISTORICAL   omeprazole (PRILOSEC) 20 MG capsule Take 1 capsule (20 mg total) by mouth 2 (two) times a day. 11/21/18  Yes Cuco Bennett MD   psyllium (METAMUCIL) 0.52 gram capsule Take 0.52 g by mouth daily.   Yes PROVIDER, HISTORICAL   warfarin (COUMADIN/JANTOVEN) 5 MG tablet TAKE 7.5 MG WEDNESDAY AND  SATURDAY AND 5 MG ALL OTHER DAYS OF THE WEEK.. 11/15/18  Yes Radha Choi MD   omeprazole (PRILOSEC OTC) 20 MG tablet Take 1 tablet (20 mg total) by mouth 2 (two) times a day. 9/11/18 12/27/18 Yes Cuco Bennett MD   acetaminophen (TYLENOL) 500 MG tablet Take 500 mg by mouth every 6 (six) hours as needed for pain.    PROVIDER, HISTORICAL   cloNIDine HCl (CATAPRES) 0.1 MG tablet Take 0.1 mg by mouth at bedtime.    PROVIDER, HISTORICAL       Allergies:   Allergies   Allergen Reactions      Contrast [Iohexol]      Contrast Dye     Iodinated Contrast- Oral And Iv Dye Hives     And shortness of breath.   This is the contrast dye used in angiograms     Iodine Hives     And convulsions from ivp dye     Lactose Diarrhea     Scallops Nausea And Vomiting       Immunizations:   Immunization History   Administered Date(s) Administered     Influenza, inj, historic,unspecified 11/12/2007, 10/09/2008     Pneumo Conj 13-V (2010&after) 05/05/2016     Pneumo Polysac 23-V 04/20/2006     Td,adult,historic,unspecified 06/15/2006     Tdap 05/05/2016     ZOSTER, LIVE 05/12/2016       Exam Chest clear to auscultation and percussion.  Heart tones regular rhythm without murmur rub or gallop.  Abdomen soft nontender no organomegaly.  No peritoneal signs.  Extremities free of edema cyanosis or clubbing.  Neck veins nondistended no thyromegaly or scleral icterus noted, carotids full.  Skin warm and dry easily conversant good spirited.  Normal intelligence.  Neurologically intact no gross localizing findings.  Smaller in stature frail in a wheelchair now not in acute distress weight down 2 pounds from last visit.    Assessment and Plan  Diabetes mellitus type 2 with hypertension and end-stage renal disease.  Possible seizure disorder anticipates neurologic consultation within the next 1-2 weeks in the meantime stop Prozac or fluoxetine as it may lower seizure threshold.    Multiple drug allergies including iodine and history of diabetic leg sores that are healing according to patient and patient's son.    Hemodialysis associated with hypotension better off clonidine.  RTC 3 months time same meds and cares stop Prozac fluoxetine.  It may lower seizure threshold.  See above    Time: total time spent with the patient was 25 minutes of which >50% was spent in counseling and coordination of care        Cuco Bennett MD    Patient Active Problem List   Diagnosis     Aneurysm Of The Abdominal Aorta     Type 2 diabetes mellitus  with kidney complication (H)     Chronic Renal Failure With Uremic Nephropathy     Vascular dementia     Depression     Lactose intolerance     Unstable angina (H)     Peripheral artery disease (H)     Typical atrial flutter (H)     Essential hypertension     Hyperlipidemia LDL goal <70     Anemia     Allergy to iodine compound     Atherosclerosis of aorta (H)     ESRD (end stage renal disease) on dialysis (H)     Coronary artery disease due to calcified coronary lesion     Atrial fibrillation [I48.91]     Physical deconditioning     Falls     Anticoagulation management encounter     Congestive heart failure, unspecified HF chronicity, unspecified heart failure type (H)

## 2021-06-23 NOTE — PROGRESS NOTES
INR 2.3 pt is having a leg angiogram on 1/30/19. Will hold Warfarin starting 1/26. Will retest with MD to restart day of procedure and retest 2/11. After talking with pt and discussing history of greens/salads and medication change. Pt will  continue  with current diet and dosing of Warfarin.  Continue with moderation of Vit K and green leafy vegetables. Cautioned to call with increase bruising or bleeding. Reminded to call with medication change especially antibiotic. Call with any questions or concerns or any up coming procedures. Cautioned about using Herbal medication.

## 2021-06-23 NOTE — TELEPHONE ENCOUNTER
RN Triage:    History of End Stage Renal Disease.  Dialysis 3 x/week.     calling after hours triage with critical level.  Creatinine: 9.02  On call physician paged.  Dr. Edmond returned page.  No action needed at this time per physician.    Carol Rowe, RN  [facility]

## 2021-06-23 NOTE — PROGRESS NOTES
Office Visit - Physical    Manuel Marrero   78 y.o. male    Date of Visit: 1/22/2019    Chief Complaint   Patient presents with     Pre-op Exam     Angiogram  Dr Romero at [facility] on 1/30/2019 fax [phone]  clinic fax [phone]       Subjective: Preoperative examination in anticipation of angiogram Wednesday next January 30, 2019 for evaluation of arteries with known history of slow healing ischemic ulcers left lower extremity.  [facility] Dr. Romero.    Fax number there [phone].    Clinic fax [phone].    Questions regarding furosemide the medication were proposed and answered he is 78 years old accompanied by his son is wheelchair-bound.  The patient is had multiple medical problems he has had cataract surgery left and right he has had a history of not abusing any alcohol and he is a non-smoker.    Allergic to contrast media iodine lactose and scallops.    He has had a history of three-vessel coronary artery disease and ischemic cardiomyopathy without intervention.  He has had a history of cardioversion for atrial fibrillation.  His last colonoscopy was done October 31, 2013 with Dr. Patel no cancer.    A recent CT scan of the abdomen showed cholelithiasis.  He was not operated on for this.    ROS: A comprehensive review of systems was performed and was otherwise negative    Medications:   Prior to Admission medications    Medication Sig Start Date End Date Taking? Authorizing Provider   aspirin 81 mg chewable tablet Chew 81 mg daily.   Yes PROVIDER, HISTORICAL   atorvastatin (LIPITOR) 80 MG tablet TAKE 1 TABLET BY MOUTH AT  BEDTIME 11/5/18  Yes Radha Choi MD   cinacalcet (SENSIPAR) 30 MG tablet Take 30 mg by mouth daily.   Yes PROVIDER, HISTORICAL   divalproex (DEPAKOTE ER) 250 MG 24 hour tablet Take 500 mg by mouth daily.          Yes PROVIDER, HISTORICAL   furosemide (LASIX) 80 MG tablet Take 160 mg by mouth daily. Indications: Renal Disease with Edema   Yes  PROVIDER, HISTORICAL   gabapentin (NEURONTIN) 100 MG capsule Take 200 mg by mouth daily. 9/26/18  Yes Cuco Bennett MD   isosorbide mononitrate (IMDUR) 30 MG 24 hr tablet TAKE 1 TABLET BY MOUTH  DAILY 12/6/18  Yes Cuco Bennett MD   losartan (COZAAR) 50 MG tablet Take 50 mg by mouth at bedtime. Indications: hypertension 8/9/18  Yes PROVIDER, HISTORICAL   metoprolol tartrate (LOPRESSOR) 100 MG tablet Take 100 mg by mouth 2 (two) times a day.   Yes PROVIDER, HISTORICAL   multivitamin (ONE A DAY) per tablet Take 1 tablet by mouth daily. Indications: Vitamin Deficiency Prevention   Yes PROVIDER, HISTORICAL   niacin (NIASPAN) 500 MG CR tablet Take 500 mg by mouth at bedtime.   Yes PROVIDER, HISTORICAL   omeprazole (PRILOSEC) 20 MG capsule Take 1 capsule (20 mg total) by mouth 2 (two) times a day. 11/21/18  Yes Cuco Bennett MD   psyllium (METAMUCIL) 0.52 gram capsule Take 0.52 g by mouth daily.   Yes PROVIDER, HISTORICAL   warfarin (COUMADIN/JANTOVEN) 5 MG tablet TAKE 7.5 MG WEDNESDAY AND  SATURDAY AND 5 MG ALL OTHER DAYS OF THE WEEK.. 11/15/18  Yes Radha Choi MD   FLUoxetine (PROZAC) 20 MG capsule Take 1 capsule (20 mg total) by mouth daily.  Patient taking differently: Take 20 mg by mouth every other day.        8/27/18 1/22/19 Yes Cuco Bennett MD   acetaminophen (TYLENOL) 500 MG tablet Take 500 mg by mouth every 6 (six) hours as needed for pain.    PROVIDER, HISTORICAL   calcium acetate (PHOSLYRA) 667 mg (169 mg calcium)/5 mL Soln solution Take 1,334 mg by mouth 3 (three) times a day. Has not been taking for a couple of months per the nurse at the Dialysis Center because  Phosphorus level was off    PROVIDER, HISTORICAL   cloNIDine HCl (CATAPRES) 0.1 MG tablet Take 0.1 mg by mouth at bedtime.    PROVIDER, HISTORICAL       Allergies:  Allergies   Allergen Reactions     Contrast [Iohexol]      Contrast Dye     Iodinated Contrast- Oral And Iv Dye Hives     And shortness of  breath.   This is the contrast dye used in angiograms     Iodine Hives     And convulsions from ivp dye     Lactose Diarrhea     Scallops Nausea And Vomiting       Immunizations:   Immunization History   Administered Date(s) Administered     Influenza, inj, historic,unspecified 2007, 10/09/2008     Pneumo Conj 13-V (2010&after) 2016     Pneumo Polysac 23-V 2006     Td,adult,historic,unspecified 06/15/2006     Tdap 2016     ZOSTER, LIVE 2016       Health Maintenance: Immunizations reviewed and up-to-date.    Past Medical History: Coronary artery disease with history of ischemic cardiomyopathy.  No intervention has been done or proposed or recommended treated medically.    Comorbidities are significant including end-stage renal disease on hemodialysis 3 times a week for the last 10 years.    Hyperglycemia and hyperlipidemia.    Atrial fibrillation by history and cardioversion for same.    Past Surgical History: Prostatectomy for prostate cancer in  no recurrence.  No history of radiation therapy or chemotherapy.    Family History: Mother  62 cerebral hemorrhage.    Father  age 80 uncertain cause.  Wife  age 61 lung cancer she had been a heavy smoker.  2 sons both with hypertension both patients of this examiner.    Social History: Retired as a  for [employer] later to become [employer] and now [employer].    Hemodialysis 3 times a week for 10 years followed by nephrology.  Retired.    Exam Chest clear to auscultation and percussion.  Heart tones regular rhythm without murmur rub or gallop.  Abdomen soft nontender no organomegaly.  No peritoneal signs.  Extremities free of edema cyanosis or clubbing.  Neck veins nondistended no thyromegaly or scleral icterus noted, carotids full.  Skin warm and dry easily conversant good spirited.  Normal intelligence.  Neurologically intact no gross localizing findings.  Wheelchair-bound not in acute distress not  toxic.    Weight down 2 pounds to 149 BMI 22.  120/66 pulse 116 and regular respiratory rate 18 and unlabored O2 sats 99% room air.    Assessment and Plan  Medically acceptable risk for anticipated surgery for this patient with extensive past medical history and allergies to contrast media plus iodinated contrast and iodine.  Hives.  Preoperatively check hemogram plus conference of metabolic profile also uric acid level below be checked because of historical fact of swollen inflamed toes.  Questionable gout    Total time spent with the patient today was 40 minutes of which greater than 50% was spent in counseling and coordination of care.    Cuco Bennett MD    Patient Active Problem List   Diagnosis     Aneurysm Of The Abdominal Aorta     Type 2 diabetes mellitus with kidney complication (H)     Chronic Renal Failure With Uremic Nephropathy     Vascular dementia     Depression     Lactose intolerance     Unstable angina (H)     Peripheral artery disease (H)     Typical atrial flutter (H)     Essential hypertension     Hyperlipidemia LDL goal <70     Anemia     Allergy to iodine compound     Atherosclerosis of aorta (H)     ESRD (end stage renal disease) on dialysis (H)     Coronary artery disease due to calcified coronary lesion     Atrial fibrillation [I48.91]     Physical deconditioning     Falls     Anticoagulation management encounter     Congestive heart failure, unspecified HF chronicity, unspecified heart failure type (H)

## 2021-06-25 NOTE — PROGRESS NOTES
Progress Notes by Radha Choi MD at 1/19/2017 11:10 AM     Author: Radha Choi MD Service: -- Author Type: Physician    Filed: 1/23/2017  2:33 PM Encounter Date: 1/19/2017 Status: Signed    : Radha Choi MD (Physician)           Click to link to [facility] HEART CARE NOTE    Thank you, Dr. Bennett, for asking us to see Manuel Marrero at the [facility].      Assessment/Recommendations   Assessment:    1.  Two-vessel coronary artery disease in circumflex and RCA: Continue on oral nitrates and aspirin.  No recurrent anginal pain.  2.  Atrial flutter: Persistent atrial flutter now is well rate controlled.  He is scheduled for cardioversion but will need for therapeutic INRs.  He was taken off of Coumadin for his procedure.  3.  Diastolic congestive heart failure: We'll control.  4.  Follow-up in a month.       History of Present Illness    Mr. Manuel Marrero is a 76 y.o. male with a history of end-stage renal disease on hemodialysis for 8 years, cerebral aneurysm with reported CVA 40 years ago, borderline diabetes mellitus and hypertension who has had persistent atrial flutter since at least October.  He was scheduled for cardioversion however was then admitted with chest pain.  He underwent an angiogram that showed severe coronary calcification with severe mid LCx and severe proximal RCA disease.  It was elected to medically manage him both with nitrates and rate control.  Proceed with cardioversion and if any recurrent anginal pain could at that point consider percutaneous intervention.  He reports that he has been feeling better.  He was started on digoxin for better rate control.  His 12-lead EKG today shows atrial flutter with variable AV block at 78.  Per minute.  He denies any further chest pain and breathing has improved.    Coronary angiogram 1/2017    Diffuse heavy calcification of his coronary arteries, aorta, iliac, and  common femoral arteries. Aortic and iliac stents are noted.    Iliac arteries are tortuous and stiff. A long radial sheath was used.    Severe mid circumflex and proximal right coronary disease. Mild-moderate LAD disease and patent left main.    Prox RCA lesion 80% stenosed.       Recommendations   ? Continue high dose statin therapy indefinitely.  ? Continue Aspirin.  ? Recommend nitrate therapy for angina.  ? Cardiac rehabilitation.  ? Consider revascularization if symptoms refractory to medical therapy.  ? Patient reports frequent episodes of difficult to control bleeding at his dialysis access post dialysis while not on antiplatelet therapy. Due to the heavy calcification of his arteries and the fact that he is a diabetic on dialysis he would be high risk for restenosis of a bare metal stent. Currently, given his dialysis bleeding issues I don't think PCI is his best option. I recommend optimization of his atrial flutter and anti-ischemic medications prior to considering PCI.            Physical Examination Review of Systems   Vitals:    01/19/17 1113   BP: 128/60   Pulse: 76   Resp: 16     Body mass index is 25.99 kg/(m^2).  Wt Readings from Last 3 Encounters:   01/19/17 161 lb (73 kg)   01/16/17 157 lb (71.2 kg)   01/14/17 154 lb 3.2 oz (69.9 kg)       General Appearance:   alert, no apparent distress   HEENT:  no scleral icterus; the mucous membranes are pink and moist                                  Neck: jugular venous pressure normal   Chest: the spine is straight and the chest is symmetric   Lungs:   respirations unlabored; the lungs are clear to auscultation   Cardiovascular:   irregular rhythm with normal first and second heart sounds and no murmurs or gallops;    Abdomen:  no organomegaly, masses, bruits, or tenderness; bowel sounds are present   Extremities: Trace edema   Skin: no xanthelasma    General: WNL  Eyes: WNL  Ears/Nose/Throat: WNL  Lungs: Shortness of Breath  Heart: Chest Pain, Shortness  of Breath with activity, Irregular Heartbeat  Stomach: WNL  Bladder: WNL  Muscle/Joints: Muscle Weakness     Nervous System: WNL  Mental Health: Depression     Blood: WNL     Medical History  Surgical History Family History Social History   Past Medical History   Diagnosis Date   ? Anemia    ? Aneurysm of abdominal aorta    ? Coronary artery disease due to calcified coronary lesion 1/12/2017     Severe RCA and LCX disease; see report of 12 Jan 17 by Dr. Jordan   ? Depression    ? Diabetes mellitus    ? Dyslipidemia, goal LDL below 70    ? End-stage renal disease on hemodialysis      hemodialysis 3 times a week   ? Essential hypertension    ? History of stroke 1976   ? Peripheral artery disease      axillary and subclavian disease   ? Prostate cancer 2004   ? Typical atrial flutter 10/18/2016   ? Vascular dementia     Past Surgical History   Procedure Laterality Date   ? Prostatectomy  2004     prostate cancer   ? Laparoscopic cholecystectomy N/A 2/11/2016     Procedure:   LAPAROSCOPIC CHOLECYSTECTOMY ;  Surgeon: Last Guevara MD;  Location: [facility] OR;  Service:    ? Pr cath plmt l hrt & arts w/njx & angio img s&i Left 1/12/2017     Procedure: Left Heart Catheterization Without Left Ventriculogram;  Surgeon: Sheri Jordan MD;  Location: [facility] Cath Lab;  Service: Cardiology   ? Cardiac catheterization N/A 1/12/2017     Procedure: Coronary Angiogram;  Surgeon: Sheri Jordan MD;  Location: [facility] Cath Lab;  Service:     Family History   Problem Relation Age of Onset   ? Stroke Mother 62     cerebral hemorrhage   ? Cancer Father    ? No Medical Problems Son    ? No Medical Problems Son     Social History     Social History   ? Marital status:      Spouse name: N/A   ? Number of children: 2   ? Years of education: N/A     Occupational History   ?  Retired     Social History Main Topics   ? Smoking status: Former Smoker     Types: Cigarettes     Start date: 1970     Quit date:  5/17/2005   ? Smokeless tobacco: Never Used   ? Alcohol use Yes      Comment: seldom   ? Drug use: No      Comment: formerly used   ? Sexual activity: Not Currently     Other Topics Concern   ? Not on file     Social History Narrative          Medications  Allergies   Current Outpatient Prescriptions   Medication Sig Dispense Refill   ? aspirin 81 MG tablet Take 81 mg by mouth daily.     ? atorvastatin (LIPITOR) 80 MG tablet Take 80 mg by mouth bedtime.     ? cinacalcet (SENSIPAR) 30 MG tablet Take 30 mg by mouth every evening.     ? cloNIDine HCl (CATAPRES) 0.1 MG tablet Take 1 tablet by mouth  twice a day 180 tablet 5   ? digoxin 62.5 mcg Tab Take 62.5 mcg by mouth daily. 90 tablet 3   ? furosemide (LASIX) 80 MG tablet Take 80 mg by mouth 2 (two) times a day at 9am and 6pm.     ? isosorbide mononitrate (IMDUR) 30 MG 24 hr tablet Take 1 tablet (30 mg total) by mouth daily. 90 tablet 3   ? metoprolol tartrate (LOPRESSOR) 100 MG tablet Take 2 tablets (200 mg total) by mouth 2 (two) times a day. 360 tablet 1   ? minoxidil (LONITEN) 2.5 MG tablet Take 2.5 mg by mouth daily.     ? multivitamin (MULTIVITAMIN) per tablet Take 1 tablet by mouth daily.     ? NIASPAN EXTENDED-RELEASE 500 mg CR tablet Take 1 tablet by mouth  daily with food 90 tablet 5   ? psyllium (METAMUCIL) 0.52 gram capsule Take 0.52 g by mouth daily.     ? warfarin (COUMADIN) 5 MG tablet Take 5 mg by mouth See Admin Instructions. Sun-Thurs     ? warfarin (COUMADIN) 5 MG tablet Take 7.5 mg by mouth See Admin Instructions. 7.5mg [id number]       No current facility-administered medications for this visit.       Allergies   Allergen Reactions   ? Contrast [Iohexol]      Contrast Dye   ? Iodine Hives     And convulsions from ivp dye   ? Lactose Diarrhea   ? Scallops Nausea And Vomiting         Lab Results    Chemistry/lipid CBC Cardiac Enzymes/BNP/TSH/INR   Lab Results   Component Value Date    CHOL 150 11/29/2016    HDL 68 11/29/2016    LDLCALC 65  11/29/2016    TRIG 84 11/29/2016    CREATININE 9.45 (HH) 01/11/2017    BUN 42 (H) 01/11/2017    K 4.9 01/12/2017     01/11/2017     01/11/2017    CO2 23 01/11/2017    Lab Results   Component Value Date    WBC 8.1 01/10/2017    HGB 10.6 (L) 01/12/2017    HCT 29.0 (L) 01/10/2017    MCV 90 01/10/2017     01/10/2017    Lab Results   Component Value Date    CKMB 2 01/10/2017    TROPONINI 0.09 01/12/2017    BNP 2428 (H) 10/18/2016    TSH 2.01 10/18/2016    INR 3.2 (!) 01/23/2017

## 2021-06-25 NOTE — PROGRESS NOTES
Progress Notes by Radha Choi MD at 10/12/2017 10:10 AM     Author: Radha Choi MD Service: -- Author Type: Physician    Filed: 10/12/2017 10:53 AM Encounter Date: 10/12/2017 Status: Signed    : Radha Choi MD (Physician)           Click to link to [facility] NOTE    Thank you, Dr. Bennett, for asking us to see Manuel Marrero at the [facility] [place] [facility].      Assessment/Recommendations   Assessment:    1.  Carotid artery disease: History of CVAs in the past.  We will proceed with CTA of his neck for further evaluation.  He will need pretreatment due to his dye allergy.  He has undergone pretreatment in the past as per his son and done well with this.  2.  Coronary artery disease: Severe two-vessel coronary artery disease with medical management being recommended due to heavy calcification.  No anginal complaints and continue on daily aspirin.  3.  Atrial flutter: History of cardioversion in February without recurrence.  Continue on Coumadin for anticoagulation  4.  Hypertension: Well-controlled.  5.  Aortic stenosis: Repeat echocardiogram this year.    Follow-up in 6 months.     History of Present Illness    Mr. Manuel Marrero is a 77 y.o. male with a history of end-stage renal disease on hemodialysis for 8 years, cerebral aneurysm with reported CVA 40 years ago, diabetes mellitus, hypertension and persistent atrial flutter status post cardioversion on 2/9/17.    He underwent an angiogram in January 2017 that showed severe coronary calcification with severe mid LCx and severe proximal RCA disease.  Medical management was recommended as long as he improved after his cardioversion which he did.  He reports that he has not had any problems with breathing or chest pain.  He denies any palpitations.  His recent ultrasound indicated bilateral significant carotid artery disease.     Physical Examination Review of Systems   Vitals:     10/12/17 1016   BP: 108/60   Pulse: 64   Resp: 16     Body mass index is 22.17 kg/(m^2).  Wt Readings from Last 3 Encounters:   10/12/17 150 lb 1.6 oz (68.1 kg)   09/07/17 151 lb (68.5 kg)   07/25/17 152 lb (68.9 kg)       General Appearance:   alert, no apparent distress   HEENT:  no scleral icterus; the mucous membranes are pink and moist                                  Neck: jugular venous pressure normal   Chest: the spine is straight and the chest is symmetric   Lungs:   respirations unlabored; the lungs are clear to auscultation   Cardiovascular:   regular rhythm with normal first and second heart sounds and soft systolic murmur; carotid bruits   Abdomen:  no organomegaly, masses, bruits, or tenderness; bowel sounds are present   Extremities: no cyanosis, clubbing, or edema   Skin: no xanthelasma    General: WNL  Eyes: WNL  Ears/Nose/Throat: WNL  Lungs: WNL  Heart: WNL  Stomach: WNL  Bladder: WNL  Muscle/Joints: WNL  Skin: WNL  Nervous System: WNL  Mental Health: WNL     Blood: WNL     Medical History  Surgical History Family History Social History   Past Medical History:   Diagnosis Date   ? Anemia    ? Aneurysm of abdominal aorta    ? Coronary artery disease due to calcified coronary lesion 1/12/2017    Severe RCA and LCX disease; see report of 12 Jan 17 by Dr. Jordan   ? Depression    ? Diabetes mellitus    ? Dyslipidemia, goal LDL below 70    ? End-stage renal disease on hemodialysis     hemodialysis 3 times a week   ? Essential hypertension    ? History of stroke 1976   ? Peripheral artery disease     axillary and subclavian disease   ? Prostate cancer 2004   ? Typical atrial flutter 10/18/2016   ? Vascular dementia     Past Surgical History:   Procedure Laterality Date   ? CARDIAC CATHETERIZATION N/A 1/12/2017    Procedure: Coronary Angiogram;  Surgeon: Sheri Jordan MD;  Location: [facility] Cath Lab;  Service:    ? CARDIOVERSION  02/09/2017   ? LAPAROSCOPIC CHOLECYSTECTOMY N/A 2/11/2016     Procedure:   LAPAROSCOPIC CHOLECYSTECTOMY ;  Surgeon: Last Guevara MD;  Location: [facility] Main OR;  Service:    ? CA CATH PLMT L HRT & ARTS W/NJX & ANGIO IMG S&I Left 1/12/2017    Procedure: Left Heart Catheterization Without Left Ventriculogram;  Surgeon: Sheri Jordan MD;  Location: [facility] Cath Lab;  Service: Cardiology   ? PROSTATECTOMY  2004    prostate cancer    Family History   Problem Relation Age of Onset   ? Stroke Mother 62     cerebral hemorrhage   ? Cancer Father    ? No Medical Problems Son    ? No Medical Problems Son     Social History     Social History   ? Marital status:      Spouse name: N/A   ? Number of children: 2   ? Years of education: N/A     Occupational History   ?  Retired     Social History Main Topics   ? Smoking status: Former Smoker     Types: Cigarettes     Start date: 1970     Quit date: 5/17/2005   ? Smokeless tobacco: Never Used   ? Alcohol use No   ? Drug use: No      Comment: formerly used   ? Sexual activity: Not Currently     Other Topics Concern   ? Not on file     Social History Narrative          Medications  Allergies   Current Outpatient Prescriptions   Medication Sig Dispense Refill   ? aspirin 81 MG tablet Take 81 mg by mouth daily.     ? atorvastatin (LIPITOR) 80 MG tablet Take 1 tablet (80 mg total) by mouth bedtime. 90 tablet 2   ? calcium acetate (PHOSLO) 667 mg capsule Take 1,334 mg by mouth 3 (three) times a day with meals.     ? cinacalcet (SENSIPAR) 30 MG tablet Take 30 mg by mouth every evening.     ? cloNIDine HCl (CATAPRES) 0.1 MG tablet Take 1 tablet by mouth  twice a day 180 tablet 5   ? furosemide (LASIX) 80 MG tablet Take 80 mg by mouth 2 (two) times a day at 9am and 6pm.     ? isosorbide mononitrate (IMDUR) 30 MG 24 hr tablet Take 1 tablet (30 mg total) by mouth daily. 90 tablet 3   ? losartan (COZAAR) 100 MG tablet Take 1 tablet (100 mg total) by mouth daily. 90 tablet 3   ? metoprolol tartrate (LOPRESSOR) 100 MG tablet Take 2  tablets by mouth two times daily 360 tablet 3   ? minoxidil (LONITEN) 2.5 MG tablet Take 1 tablet (2.5 mg total) by mouth daily. 60 tablet 12   ? multivitamin (MULTIVITAMIN) per tablet Take 1 tablet by mouth daily.     ? NIASPAN EXTENDED-RELEASE 500 mg CR tablet Take 1 tablet by mouth  daily with food 90 tablet 5   ? psyllium (METAMUCIL) 0.52 gram capsule Take 0.52 g by mouth daily.     ? warfarin (COUMADIN) 5 MG tablet TAKE 1 TABLET BY MOUTH ON  SUNDAY, TUESDAY AND  THURSDAY, AND TAKE 1 AND  1/2 TABLETS ALL THE OTHER  DAYS 135 tablet 0   ? atorvastatin (LIPITOR) 80 MG tablet TAKE 1 TABLET BY MOUTH AT  BEDTIME 90 tablet 2   ? diphenhydrAMINE (BENADRYL) 25 mg tablet Take 25 mg by mouth as needed.       Current Facility-Administered Medications   Medication Dose Route Frequency Provider Last Rate Last Dose   ? methylPREDNISolone tablet 32 mg (MEDROL)  32 mg Oral Once Radha Choi MD       ? methylPREDNISolone tablet 32 mg (MEDROL)  32 mg Oral Once Radha Choi MD          Allergies   Allergen Reactions   ? Contrast [Iohexol]      Contrast Dye   ? Iodinated Contrast- Oral And Iv Dye Hives     And shortness of breath.   This is the contrast dye used in angiograms   ? Iodine Hives     And convulsions from ivp dye   ? Lactose Diarrhea   ? Scallops Nausea And Vomiting         Lab Results    Chemistry/lipid CBC Cardiac Enzymes/BNP/TSH/INR   Lab Results   Component Value Date    CHOL 170 09/07/2017    HDL 92 09/07/2017    LDLCALC 69 09/07/2017    TRIG 46 09/07/2017    CREATININE 9.45 (HH) 01/11/2017    BUN 42 (H) 01/11/2017    K 4.7 08/22/2017     01/11/2017     01/11/2017    CO2 23 01/11/2017    Lab Results   Component Value Date    WBC 8.1 01/10/2017    HGB 11.1 (L) 08/22/2017    HCT 29.0 (L) 01/10/2017    MCV 90 01/10/2017     08/22/2017    Lab Results   Component Value Date    CKMB 2 01/10/2017    TROPONINI 0.09 01/12/2017    BNP 2428 (H) 10/18/2016    TSH 2.01 10/18/2016    INR 2.1  10/09/2017

## 2021-06-25 NOTE — PROGRESS NOTES
Progress Notes by Radha Choi MD at 5/31/2017  8:30 AM     Author: Radha Choi MD Service: -- Author Type: Physician    Filed: 5/31/2017  9:15 AM Encounter Date: 5/31/2017 Status: Signed    : Radha Choi MD (Physician)           Click to link to [facility] HEART CARE NOTE    Thank you, Dr. Bennett, for asking us to see Manuel Marrero at the [facility].      Assessment/Recommendations   Assessment:    1.  Atrial flutter: Status post cardioversion in February without recurrence.  He reports feeling much better since the cardioversion with improved energy and less shortness of breath.  Continue on Coumadin for anticoagulation.  2.  Coronary artery disease: Severe two-vessel coronary artery disease involving his RCA and circumflex.  Medical management at this point was recommended due to problems with bleeding during dialysis sessions at his access site.  Continue on aspirin.  No anginal complaints.  3 hypertension: Mildly elevated today and his son reports that it has been extremely elevated during dialysis.  Will increase losartan to 100 mg a day.  4.  Follow-up in 6 months.       History of Present Illness    Mr. Manuel Marrero is a 77 y.o. male with a history of end-stage renal disease on hemodialysis for 8 years, cerebral aneurysm with reported CVA 40 years ago, diabetes mellitus, hypertension and persistent atrial flutter status post cardioversion on 2/9/17.  He was initially scheduled for cardioversion earlier than that however he then developed chest discomfort.  He underwent an angiogram that showed severe coronary calcification with severe mid LCx and severe proximal RCA disease.  Medical management was recommended due to difficult to control bleeding during his dialysis sessions at his access site.  After he underwent cardioversion he reports feeling better with improved energy and less shortness of breath with exertion.   His son who is here with him today is noticed the same.  He denies any problems with chest pain or palpitations.    Twelve-lead EKG shows bradycardia at 56 bpm with nonspecific T-wave abnormality    Coronary angiogram: 1/2017    Diffuse heavy calcification of his coronary arteries, aorta, iliac, and common femoral arteries. Aortic and iliac stents are noted.    Iliac arteries are tortuous and stiff. A long radial sheath was used.    Severe mid circumflex and proximal right coronary disease. Mild-moderate LAD disease and patent left main.    Prox RCA lesion 80% stenosed.       Recommendations   ? Continue high dose statin therapy indefinitely.  ? Continue Aspirin.  ? Recommend nitrate therapy for angina.  ? Cardiac rehabilitation.  ? Consider revascularization if symptoms refractory to medical therapy.  ? Patient reports frequent episodes of difficult to control bleeding at his dialysis access post dialysis while not on antiplatelet therapy. Due to the heavy calcification of his arteries and the fact that he is a diabetic on dialysis he would be high risk for restenosis of a bare metal stent. Currently, given his dialysis bleeding issues I don't think PCI is his best option. I recommend optimization of his atrial flutter and anti-ischemic medications prior to considering PCI.            Physical Examination Review of Systems   Vitals:    05/31/17 0838   BP: 140/60   Pulse: 60   Resp: 16     Body mass index is 24.84 kg/(m^2).  Wt Readings from Last 3 Encounters:   05/31/17 158 lb 9.6 oz (71.9 kg)   05/23/17 156 lb (70.8 kg)   04/11/17 158 lb (71.7 kg)       General Appearance:   alert, no apparent distress   HEENT:  no scleral icterus; the mucous membranes are pink and moist                                  Neck: jugular venous pressure normal   Chest: the spine is straight and the chest is symmetric   Lungs:   respirations unlabored; the lungs are clear to auscultation   Cardiovascular:   regular rhythm with normal  first and second heart sounds and no murmurs or gallops; carotid pulses are intact and there are no carotid bruits.   Abdomen:  no organomegaly, masses, bruits, or tenderness; bowel sounds are present   Extremities: trace edema   Skin: no xanthelasma    General: WNL  Eyes: WNL  Ears/Nose/Throat: WNL  Lungs: WNL  Heart: WNL  Stomach: WNL  Bladder: WNL  Muscle/Joints: WNL  Skin: WNL  Nervous System: WNL  Mental Health: WNL     Blood: WNL     Medical History  Surgical History Family History Social History   Past Medical History:   Diagnosis Date   ? Anemia    ? Aneurysm of abdominal aorta    ? Coronary artery disease due to calcified coronary lesion 1/12/2017    Severe RCA and LCX disease; see report of 12 Jan 17 by Dr. Jordan   ? Depression    ? Diabetes mellitus    ? Dyslipidemia, goal LDL below 70    ? End-stage renal disease on hemodialysis     hemodialysis 3 times a week   ? Essential hypertension    ? History of stroke 1976   ? Peripheral artery disease     axillary and subclavian disease   ? Prostate cancer 2004   ? Typical atrial flutter 10/18/2016   ? Vascular dementia     Past Surgical History:   Procedure Laterality Date   ? CARDIAC CATHETERIZATION N/A 1/12/2017    Procedure: Coronary Angiogram;  Surgeon: Sheri Jordan MD;  Location: [facility] Cath Lab;  Service:    ? CARDIOVERSION  02/09/2017   ? LAPAROSCOPIC CHOLECYSTECTOMY N/A 2/11/2016    Procedure:   LAPAROSCOPIC CHOLECYSTECTOMY ;  Surgeon: Last Guevara MD;  Location: [facility] OR;  Service:    ? CT CATH PLMT L HRT & ARTS W/NJX & ANGIO IMG S&I Left 1/12/2017    Procedure: Left Heart Catheterization Without Left Ventriculogram;  Surgeon: Sheri Jordan MD;  Location: [facility] Cath Lab;  Service: Cardiology   ? PROSTATECTOMY  2004    prostate cancer    Family History   Problem Relation Age of Onset   ? Stroke Mother 62     cerebral hemorrhage   ? Cancer Father    ? No Medical Problems Son    ? No Medical Problems Son     Social  History     Social History   ? Marital status:      Spouse name: N/A   ? Number of children: 2   ? Years of education: N/A     Occupational History   ?  Retired     Social History Main Topics   ? Smoking status: Former Smoker     Types: Cigarettes     Start date: 1970     Quit date: 5/17/2005   ? Smokeless tobacco: Never Used   ? Alcohol use No   ? Drug use: No      Comment: formerly used   ? Sexual activity: Not Currently     Other Topics Concern   ? Not on file     Social History Narrative          Medications  Allergies   Current Outpatient Prescriptions   Medication Sig Dispense Refill   ? aspirin 81 MG tablet Take 81 mg by mouth daily.     ? atorvastatin (LIPITOR) 80 MG tablet Take 1 tablet (80 mg total) by mouth bedtime. 90 tablet 2   ? calcium acetate (PHOSLO) 667 mg capsule Take 1,334 mg by mouth 3 (three) times a day with meals.     ? cinacalcet (SENSIPAR) 30 MG tablet Take 30 mg by mouth every evening.     ? cloNIDine HCl (CATAPRES) 0.1 MG tablet Take 1 tablet by mouth  twice a day 180 tablet 5   ? furosemide (LASIX) 80 MG tablet Take 80 mg by mouth 2 (two) times a day at 9am and 6pm.     ? isosorbide mononitrate (IMDUR) 30 MG 24 hr tablet Take 1 tablet (30 mg total) by mouth daily. 90 tablet 3   ? metoprolol tartrate (LOPRESSOR) 100 MG tablet Take 1 tablet (100 mg total) by mouth 2 (two) times a day. 360 tablet 1   ? minoxidil (LONITEN) 2.5 MG tablet Take 1 tablet (2.5 mg total) by mouth daily. (Patient taking differently: Take 2.5 mg by mouth 2 (two) times a day. ) 60 tablet 12   ? multivitamin (MULTIVITAMIN) per tablet Take 1 tablet by mouth daily.     ? NIASPAN EXTENDED-RELEASE 500 mg CR tablet Take 1 tablet by mouth  daily with food 90 tablet 5   ? psyllium (METAMUCIL) 0.52 gram capsule Take 0.52 g by mouth daily.     ? warfarin (COUMADIN) 5 MG tablet Take 5 mg Sun, Tue, Thurs and 7.5 mg all other days. 135 tablet 0   ? warfarin (COUMADIN) 5 MG tablet Take 1 tablet by mouth on  Sunday, Tuesday  and  Thursday, and 1 and 1/2  tablets by mouth all the  other days 135 tablet 0   ? losartan (COZAAR) 100 MG tablet Take 1 tablet (100 mg total) by mouth daily. 90 tablet 3     No current facility-administered medications for this visit.       Allergies   Allergen Reactions   ? Contrast [Iohexol]      Contrast Dye   ? Iodinated Contrast Media - Oral And Iv Dye Hives     And shortness of breath.   This is the contrast dye used in angiograms   ? Iodine Hives     And convulsions from ivp dye   ? Lactose Diarrhea   ? Scallops Nausea And Vomiting         Lab Results    Chemistry/lipid CBC Cardiac Enzymes/BNP/TSH/INR   Lab Results   Component Value Date    CHOL 183 03/10/2017    HDL 86 03/10/2017    LDLCALC 87 03/10/2017    TRIG 48 03/10/2017    CREATININE 9.45 (HH) 01/11/2017    BUN 42 (H) 01/11/2017    K 5.3 (H) 02/09/2017     01/11/2017     01/11/2017    CO2 23 01/11/2017    Lab Results   Component Value Date    WBC 8.1 01/10/2017    HGB 9.9 (L) 04/11/2017    HCT 29.0 (L) 01/10/2017    MCV 90 01/10/2017     01/10/2017    Lab Results   Component Value Date    CKMB 2 01/10/2017    TROPONINI 0.09 01/12/2017    BNP 2428 (H) 10/18/2016    TSH 2.01 10/18/2016    INR 2.6 05/15/2017

## 2021-06-26 NOTE — PROGRESS NOTES
Progress Notes by Radha Choi MD at 5/30/2018  8:30 AM     Author: Radha Choi MD Service: -- Author Type: Physician    Filed: 5/30/2018  9:09 AM Encounter Date: 5/30/2018 Status: Signed    : Radha Choi MD (Physician)           Click to link to [facility] HEART [facility] NOTE    Thank you, Dr. Bennett, for asking us to see Manuel Marrero at the [facility].      Assessment/Recommendations   Assessment:    1.  Carotid artery disease: Had made recommendations for a CTA of his neck however he has a significant contrast allergy after discussing it further with his family and primary decided against continuing further evaluation.  2.  Coronary artery disease: Severe two-vessel coronary artery disease with medical management being recommended due to heavy calcification.  No anginal complaints and continue on daily aspirin.  3.  Atrial flutter: History of cardioversion in February of 2017 without recurrence.  Continue on Coumadin for anticoagulation  4.  Hypertension: Well-controlled.  Due to recent issues with hypotension losartan and clonidine have been decreased.  Blood pressure well controlled today.  Follow-up in 6 months.       History of Present Illness    Mr. Manuel Marrero is a 78 y.o. male with a history of end-stage renal disease on hemodialysis for 8 years, cerebral aneurysm with CVA 40 years ago, diabetes mellitus type II, peripheral arterial disease, hypertension and persistent atrial flutter status post cardioversion 2/9/17.  Last year he underwent coronary angiogram that showed severe coronary calcification with severe mid LCx and severe proximal RCA disease.  It was elected to medically manage him both with nitrates and rate control.  With medical therapy he has been feeling much better.  No complaints of chest discomfort, palpitations or shortness of breath.  Weight has remained stable with dialysis.   He has been  following with Dr. Deon Romero for his peripheral arterial disease.  He was told he would not be a candidate for percutaneous intervention and he would have to undergo bypass surgery.  After discussion with Dr. Romero, Dr. Mejia and the family they decided to continue with medical therapy especially as his leg discomfort has been improving.     Coronary angiogram 1/2017    Diffuse heavy calcification of his coronary arteries, aorta, iliac, and common femoral arteries. Aortic and iliac stents are noted.    Iliac arteries are tortuous and stiff. A long radial sheath was used.    Severe mid circumflex and proximal right coronary disease. Mild-moderate LAD disease and patent left main.    Prox RCA lesion 80% stenosed.       Recommendations   ? Continue high dose statin therapy indefinitely.  ? Continue Aspirin.  ? Recommend nitrate therapy for angina.  ? Cardiac rehabilitation.  ? Consider revascularization if symptoms refractory to medical therapy.  ? Patient reports frequent episodes of difficult to control bleeding at his dialysis access post dialysis while not on antiplatelet therapy. Due to the heavy calcification of his arteries and the fact that he is a diabetic on dialysis he would be high risk for restenosis of a bare metal stent. Currently, given his dialysis bleeding issues I don't think PCI is his best option. I recommend optimization of his atrial flutter and anti-ischemic medications prior to considering PCI.            Physical Examination Review of Systems   Vitals:    05/30/18 0834   BP: 118/52   Pulse: 76   Resp: 14     Body mass index is 25.22 kg/(m^2).  Wt Readings from Last 3 Encounters:   05/30/18 161 lb (73 kg)   05/22/18 157 lb (71.2 kg)   04/24/18 156 lb (70.8 kg)       General Appearance:   alert, no apparent distress   HEENT:  no scleral icterus; the mucous membranes are pink and moist                                  Neck: jugular venous pressure normal   Chest: the spine is straight and  the chest is symmetric   Lungs:   respirations unlabored; the lungs are clear to auscultation   Cardiovascular:   regular rhythm with normal first and second heart sounds and no murmurs or gallops   Abdomen:  no organomegaly, masses, bruits, or tenderness; bowel sounds are present   Extremities: trace edema   Skin: no xanthelasma    General: WNL  Eyes: WNL  Ears/Nose/Throat: WNL  Lungs: WNL  Heart: WNL  Stomach: WNL  Bladder: WNL  Muscle/Joints: WNL  Skin: WNL  Nervous System: WNL  Mental Health: WNL     Blood: WNL     Medical History  Surgical History Family History Social History   Past Medical History:   Diagnosis Date   ? Anemia    ? Aneurysm of abdominal aorta    ? Coronary artery disease due to calcified coronary lesion 1/12/2017    Severe RCA and LCX disease; see report of 12 Jan 17 by Dr. Jordan   ? Depression    ? Diabetes mellitus    ? Dyslipidemia, goal LDL below 70    ? End-stage renal disease on hemodialysis     hemodialysis 3 times a week   ? Essential hypertension    ? History of stroke 1976   ? Peripheral artery disease     axillary and subclavian disease   ? Prostate cancer 2004   ? Typical atrial flutter 10/18/2016   ? Vascular dementia     Past Surgical History:   Procedure Laterality Date   ? CARDIAC CATHETERIZATION N/A 1/12/2017    Procedure: Coronary Angiogram;  Surgeon: Sheri Jordan MD;  Location: [facility] Cath Lab;  Service:    ? CARDIOVERSION  02/09/2017   ? LAPAROSCOPIC CHOLECYSTECTOMY N/A 2/11/2016    Procedure:   LAPAROSCOPIC CHOLECYSTECTOMY ;  Surgeon: Last Guevara MD;  Location: [facility] OR;  Service:    ? MN CATH PLMT L HRT & ARTS W/NJX & ANGIO IMG S&I Left 1/12/2017    Procedure: Left Heart Catheterization Without Left Ventriculogram;  Surgeon: Sheri Jordan MD;  Location: [facility] Cath Lab;  Service: Cardiology   ? PROSTATECTOMY  2004    prostate cancer    Family History   Problem Relation Age of Onset   ? Stroke Mother 62     cerebral hemorrhage   ? Cancer  Father    ? No Medical Problems Son    ? No Medical Problems Son     Social History     Social History   ? Marital status:      Spouse name: N/A   ? Number of children: 2   ? Years of education: N/A     Occupational History   ?  Retired     Social History Main Topics   ? Smoking status: Former Smoker     Types: Cigarettes     Start date: 1970     Quit date: 5/17/2005   ? Smokeless tobacco: Never Used   ? Alcohol use No   ? Drug use: No      Comment: formerly used   ? Sexual activity: Not Currently     Other Topics Concern   ? Not on file     Social History Narrative          Medications  Allergies   Current Outpatient Prescriptions   Medication Sig Dispense Refill   ? aspirin 81 MG tablet Take 81 mg by mouth daily.     ? calcium acetate (PHOSLO) 667 mg capsule Take 667 mg by mouth 3 (three) times a day with meals.      ? cinacalcet (SENSIPAR) 30 MG tablet Take 30 mg by mouth every evening.     ? cloNIDine HCl (CATAPRES) 0.1 MG tablet TAKE 1 TABLET BY MOUTH  TWICE A DAY (Patient taking differently: TAKE 1 TABLET BY MOUTH  once a day) 180 tablet 3   ? furosemide (LASIX) 80 MG tablet TAKE 2 TABLETS BY MOUTH  DAILY 180 tablet 5   ? losartan (COZAAR) 100 MG tablet Take 1 tablet (100 mg total) by mouth daily. (Patient taking differently: Take 100 mg by mouth daily. One half tablet daily) 90 tablet 3   ? metoprolol tartrate (LOPRESSOR) 100 MG tablet TAKE 2 TABLETS BY MOUTH TWO TIMES DAILY 360 tablet 1   ? minoxidil (LONITEN) 2.5 MG tablet Take 2.5 mg by mouth daily.     ? multivitamin (MULTIVITAMIN) per tablet Take 1 tablet by mouth daily.     ? NIASPAN EXTENDED-RELEASE 500 mg CR tablet TAKE 1 TABLET BY MOUTH  DAILY WITH FOOD 90 tablet 5   ? psyllium (METAMUCIL) 0.52 gram capsule Take 0.52 g by mouth daily.     ? warfarin (COUMADIN) 5 MG tablet Take 5 mg Sun, tue thur and 7.5 mg all other days. 135 tablet 0   ? atorvastatin (LIPITOR) 80 MG tablet Take 1 tablet (80 mg total) by mouth bedtime. 90 tablet 2   ?  diphenhydrAMINE (BENADRYL) 25 mg tablet Take 25 mg by mouth as needed.       Current Facility-Administered Medications   Medication Dose Route Frequency Provider Last Rate Last Dose   ? methylPREDNISolone tablet 32 mg (MEDROL)  32 mg Oral Once Radha Choi MD       ? methylPREDNISolone tablet 32 mg (MEDROL)  32 mg Oral Once Radha Choi MD          Allergies   Allergen Reactions   ? Contrast [Iohexol]      Contrast Dye   ? Iodinated Contrast- Oral And Iv Dye Hives     And shortness of breath.   This is the contrast dye used in angiograms   ? Iodine Hives     And convulsions from ivp dye   ? Lactose Diarrhea   ? Scallops Nausea And Vomiting         Lab Results    Chemistry/lipid CBC Cardiac Enzymes/BNP/TSH/INR   Lab Results   Component Value Date    CHOL 191 04/24/2018    HDL 92 04/24/2018    LDLCALC 86 04/24/2018    TRIG 64 04/24/2018    CREATININE 9.43 (HH) 03/20/2018    BUN 44 (H) 03/20/2018    K 4.8 03/20/2018     03/20/2018     03/20/2018    CO2 24 03/20/2018    Lab Results   Component Value Date    WBC 6.8 03/20/2018    HGB 11.5 (L) 03/20/2018    HCT 35.4 (L) 03/20/2018    MCV 98 03/20/2018     03/20/2018    Lab Results   Component Value Date    CKMB 2 01/10/2017    TROPONINI 0.09 02/14/2018    BNP 2428 (H) 10/18/2016    TSH 2.01 10/18/2016    INR 2.4 05/14/2018

## 2021-06-26 NOTE — PROGRESS NOTES
Progress Notes by Radha Choi MD at 10/11/2018  1:30 PM     Author: Radha Choi MD Service: -- Author Type: Physician    Filed: 10/15/2018 12:54 PM Encounter Date: 10/11/2018 Status: Signed    : Radha Choi MD (Physician)           Click to link to [facility] HEART [facility] NOTE    Thank you, Dr. Bennett, for asking us to see Manuel Marrero at the [facility].      Assessment/Recommendations   Assessment:    1.  Carotid artery disease: Due to a significant contrast allergy his family has decided against a CTA of his neck for further evaluation of his carotid disease.  2.  Coronary artery disease: Severe two-vessel coronary artery disease with medical management being recommended due to heavy calcification.  Continue on daily aspirin and oral nitrates.  He has not had any anginal complaints.  3.  Atrial flutter: History of cardioversion in February of 2017 without recurrence.  Continue on Coumadin for anticoagulation  4.  Hypertension: Blood pressure slightly low today.  His son reports it is lower than usual today.  Patient is asymptomatic.  If blood pressure continues to run low limiting dialysis will recommend stopping the clonidine.  Follow-up in 6 months.       History of Present Illness    Mr. Manuel Marrero is a 78 y.o. male with a history of end-stage renal disease on hemodialysis for about 10 years, cerebral aneurysm with CVA 40 years ago, diabetes mellitus type II, peripheral arterial disease, hypertension and persistent atrial flutter status post cardioversion on 2/9/17.   In January 2017 he underwent a coronary angiogram that showed severe coronary calcification with severe mid LCx and severe proximal RCA disease.  It was elected to medically manage him both with nitrates and rate control.   He has not had any chest discomfort.  He follows with Dr. Medeiros from nephrology and has been doing well on dialysis with stable  weights.  No problems with shortness of breath or increased edema.   He has been following with Dr. Deon Romero for his peripheral arterial disease.  He was told he would not be a candidate for percutaneous intervention and he would have to undergo bypass surgery.  After discussion with Dr. Romero, Dr. Mejia and the family they decided to continue with medical therapy.     Physical Examination Review of Systems   Vitals:    10/11/18 1345   BP: 92/50   Pulse: 64   Resp: 16     Body mass index is 22.61 kg/(m^2).  Wt Readings from Last 3 Encounters:   10/11/18 153 lb 1.6 oz (69.4 kg)   09/27/18 152 lb (68.9 kg)   08/28/18 155 lb 4.8 oz (70.4 kg)       General Appearance:   alert, no apparent distress   HEENT:  no scleral icterus; the mucous membranes are pink and moist                                  Neck: jugular venous pressure normal   Chest: the spine is straight and the chest is symmetric   Lungs:   respirations unlabored; the lungs are clear to auscultation   Cardiovascular:   regular rhythm with normal first and second heart sounds and no murmurs or gallops   Abdomen:  no organomegaly, masses, bruits, or tenderness; bowel sounds are present   Extremities: no cyanosis, clubbing, or edema   Skin: no xanthelasma    General: WNL  Eyes: WNL  Ears/Nose/Throat: WNL  Lungs: WNL  Heart: WNL  Stomach: WNL  Bladder: WNL  Muscle/Joints: WNL  Skin: Poor Wound Healing  Nervous System: Falls, Daytime Sleepiness  Mental Health: WNL     Blood: Easy Bruising     Medical History  Surgical History Family History Social History   Past Medical History:   Diagnosis Date   ? Allergy to iodine compound     He reports hives and convulsions with iodinated contrast media   ? Anaclitic depression    ? Anemia    ? Aneurysm of abdominal aorta (H)    ? Aneurysm Of The Abdominal Aorta    ? Anticoagulation management encounter 7/8/2018   ? Atherosclerosis of aorta (H)    ? Atherosclerosis of coronary artery    ? Atrial fibrillation (H)    ?  Atrial flutter (H)    ? Chronic Renal Failure With Uremic Nephropathy     HD on Mon-Wed-Fri   ? CKD (chronic kidney disease)    ? Coronary artery disease due to calcified coronary lesion 1/12/2017    Severe RCA and LCX disease; see report of 12 Jan 17 by Dr. Jordan   ? Depression    ? Diabetes mellitus (H)    ? Dyslipidemia, goal LDL below 70    ? End-stage renal disease on hemodialysis (H)     hemodialysis 3 times a week   ? ESRD (end stage renal disease) (H)    ? ESRD (end stage renal disease) on dialysis (H)    ? Essential hypertension    ? Falls 7/6/2018   ? History of stroke 1976   ? Hyperlipidemia LDL goal <70    ? Lactose intolerance    ? Peripheral arterial occlusive disease (H)    ? Peripheral artery disease (H)     axillary and subclavian disease   ? Physical deconditioning 7/6/2018   ? Prostate cancer (H) 2004   ? Type 2 diabetes mellitus (H)    ? Type 2 diabetes mellitus with kidney complication (H)    ? Typical atrial flutter (H) 10/18/2016   ? Unstable angina (H) 1/10/2017   ? Unstable angina pectoris (H)    ? Vascular dementia     Past Surgical History:   Procedure Laterality Date   ? CARDIAC CATHETERIZATION N/A 1/12/2017    Procedure: Coronary Angiogram;  Surgeon: Sheri Jordan MD;  Location: [facility] Cath Lab;  Service:    ? CARDIOVERSION  02/09/2017   ? LAPAROSCOPIC CHOLECYSTECTOMY N/A 2/11/2016    Procedure:   LAPAROSCOPIC CHOLECYSTECTOMY ;  Surgeon: Last Guevara MD;  Location: [facility] OR;  Service:    ? CT CATH PLMT L HRT & ARTS W/NJX & ANGIO IMG S&I Left 1/12/2017    Procedure: Left Heart Catheterization Without Left Ventriculogram;  Surgeon: Sheri Jordan MD;  Location: [facility] Cath Lab;  Service: Cardiology   ? PROSTATECTOMY  2004    prostate cancer    Family History   Problem Relation Age of Onset   ? Stroke Mother 62     cerebral hemorrhage   ? Cancer Father    ? No Medical Problems Son    ? No Medical Problems Son     Social History     Social History   ? Marital  status:      Spouse name: N/A   ? Number of children: 2   ? Years of education: N/A     Occupational History   ?  Retired     Social History Main Topics   ? Smoking status: Former Smoker     Types: Cigarettes     Start date: 1970     Quit date: 5/17/2005   ? Smokeless tobacco: Never Used   ? Alcohol use No   ? Drug use: No      Comment: formerly used   ? Sexual activity: Not Currently     Other Topics Concern   ? Not on file     Social History Narrative          Medications  Allergies   Current Outpatient Prescriptions   Medication Sig Dispense Refill   ? acetaminophen (TYLENOL) 500 MG tablet Take 500 mg by mouth every 6 (six) hours as needed for pain.     ? aspirin 81 mg chewable tablet Chew 81 mg daily.     ? atorvastatin (LIPITOR) 80 MG tablet Take 80 mg by mouth daily.     ? cinacalcet (SENSIPAR) 30 MG tablet Take 30 mg by mouth daily.     ? cloNIDine HCl (CATAPRES) 0.1 MG tablet Take 0.1 mg by mouth at bedtime.     ? divalproex (DEPAKOTE ER) 250 MG 24 hour tablet Take 500 mg by mouth daily.      ? FLUoxetine (PROZAC) 20 MG capsule Take 1 capsule (20 mg total) by mouth daily. 90 capsule 5   ? furosemide (LASIX) 80 MG tablet Take 160 mg by mouth daily. Indications: Renal Disease with Edema     ? gabapentin (NEURONTIN) 100 MG capsule Take 200 mg by mouth daily. 180 capsule 3   ? isosorbide mononitrate (IMDUR) 30 MG 24 hr tablet Take 30 mg by mouth daily.     ? losartan (COZAAR) 50 MG tablet Take 50 mg by mouth at bedtime. Indications: hypertension     ? metoprolol tartrate (LOPRESSOR) 100 MG tablet Take 100 mg by mouth 2 (two) times a day.     ? multivitamin (ONE A DAY) per tablet Take 1 tablet by mouth daily. Indications: Vitamin Deficiency Prevention     ? niacin (NIASPAN) 500 MG CR tablet Take 500 mg by mouth at bedtime.     ? omeprazole (PRILOSEC OTC) 20 MG tablet Take 1 tablet (20 mg total) by mouth 2 (two) times a day. 180 tablet 3   ? psyllium (METAMUCIL) 0.52 gram capsule Take 0.52 g by mouth daily.      ? warfarin (COUMADIN) 5 MG tablet Take 7.5 mg Wed and sat and 5 mg all other days of the week. 100 tablet 0   ? warfarin sodium (WARFARIN ORAL) Take by mouth. 7/26/18 INR 1.0  Take 5mg on 7/26.  Continue Heparin 5000 units two times a day.  Next INR 7/27/18.     ? calcium acetate (PHOSLYRA) 667 mg (169 mg calcium)/5 mL Soln solution Take 1,334 mg by mouth 3 (three) times a day. Has not been taking for a couple of months per the nurse [facility] the [facility] because  Phosphorus level was off       Current Facility-Administered Medications   Medication Dose Route Frequency Provider Last Rate Last Dose   ? methylPREDNISolone tablet 32 mg (MEDROL)  32 mg Oral Once Radha Choi MD       ? methylPREDNISolone tablet 32 mg (MEDROL)  32 mg Oral Once Radha Choi MD          Allergies   Allergen Reactions   ? Contrast [Iohexol]      Contrast Dye   ? Iodinated Contrast- Oral And Iv Dye Hives     And shortness of breath.   This is the contrast dye used in angiograms   ? Iodine Hives     And convulsions from ivp dye   ? Lactose Diarrhea   ? Scallops Nausea And Vomiting         Lab Results    Chemistry/lipid CBC Cardiac Enzymes/BNP/TSH/INR   Lab Results   Component Value Date    CHOL 177 09/27/2018    HDL 66 09/27/2018    LDLCALC 97 09/27/2018    TRIG 68 09/27/2018    CREATININE 9.43 (HH) 03/20/2018    BUN 44 (H) 03/20/2018    K 3.9 07/24/2018     03/20/2018     03/20/2018    CO2 24 03/20/2018    Lab Results   Component Value Date    WBC 6.8 03/20/2018    HGB 8.1 (L) 07/24/2018    HCT 35.4 (L) 03/20/2018    MCV 98 03/20/2018     07/24/2018    Lab Results   Component Value Date    CKMB 2 01/10/2017    TROPONINI 0.09 02/14/2018    BNP 2428 (H) 10/18/2016    TSH 2.01 10/18/2016    INR 2.60 (!) 10/11/2018

## 2021-07-03 NOTE — ANESTHESIA PREPROCEDURE EVALUATION
Anesthesia Preprocedure Evaluation by Marycarmen Quiroga MD at 2/9/2017 12:16 PM     Author: Marycarmen Quiroga MD Service: -- Author Type: Physician    Filed: 2/9/2017  1:08 PM Date of Service: 2/9/2017 12:16 PM Status: Addendum    : Marycarmen Quiroga MD (Physician)    Related Notes: Original Note by Marycarmen Quiroga MD (Physician) filed at 2/9/2017 12:36 PM       Anesthesia Evaluation      Patient summary reviewed     Airway   Mallampati: III  Neck ROM: full   Pulmonary     breath sounds clear to auscultation                         Cardiovascular   Exercise tolerance: < 4 METS  (+) hypertension, CAD, angina, ,     Rhythm: irregular        Neuro/Psych    (+) CVA , depression,     Comments: Left sided weakness. Ambulates with walker    Endo/Other    (+) diabetes mellitus,      GI/Hepatic/Renal    (+)   chronic renal disease dialysis and ESRD, last dialysis date: 2/8/2017,      Other findings: Past Medical History  Diagnosis Date  ? Anemia    ? Aneurysm of abdominal aorta    ? Coronary artery disease due to calcified coronary lesion 1/12/2017      Severe RCA and LCX disease; see report of 12 Jan 17 by Dr. Jordan  ? Depression    ? Diabetes mellitus    ? Dyslipidemia, goal LDL below 70    ? End-stage renal disease on hemodialysis        hemodialysis 3 times a week  ? Essential hypertension    ? History of stroke 1976  ? Peripheral artery disease        axillary and subclavian disease  ? Prostate cancer 2004  ? Typical atrial flutter 10/18/2016  ? Vascular dementia            Dental    (+) caps                           Anesthesia Plan  Planned anesthetic: general mask and total IV anesthesia    ASA 4   Induction: intravenous   Anesthetic plan and risks discussed with: patient  Anesthesia plan special considerations: antiemetics,   Post-op plan: routine recovery        Marycarmen Quiroga MD  Staff Anesthesiologist  [facility]  [facility]  Division  2/9/17

## 2021-07-14 PROBLEM — I10 HYPERTENSION: Status: RESOLVED | Noted: 2017-01-10 | Resolved: 2017-01-11

## 2021-10-09 ENCOUNTER — HEALTH MAINTENANCE LETTER (OUTPATIENT)
Age: 81
End: 2021-10-09

## 2022-07-10 ENCOUNTER — HEALTH MAINTENANCE LETTER (OUTPATIENT)
Age: 82
End: 2022-07-10

## 2022-09-11 ENCOUNTER — HEALTH MAINTENANCE LETTER (OUTPATIENT)
Age: 82
End: 2022-09-11

## 2022-10-20 ENCOUNTER — TELEPHONE (OUTPATIENT)
Dept: INTERNAL MEDICINE | Facility: CLINIC | Age: 82
End: 2022-10-20

## 2022-10-20 NOTE — TELEPHONE ENCOUNTER
Caller's name: Manuel Marrero  Relationship to patient: Son  Caller's contact information: [phone]     home name & mortician name: [employer], service at [facility]    Patient Name: Manuel Marrero  YOB: 1940  Date of Death: 2019  Location of death: [facility]  MRN# [id number]